# Patient Record
Sex: MALE | Race: WHITE | NOT HISPANIC OR LATINO | URBAN - METROPOLITAN AREA
[De-identification: names, ages, dates, MRNs, and addresses within clinical notes are randomized per-mention and may not be internally consistent; named-entity substitution may affect disease eponyms.]

---

## 2019-01-29 ENCOUNTER — OFFICE VISIT CONVERTED (OUTPATIENT)
Dept: FAMILY MEDICINE CLINIC | Facility: CLINIC | Age: 44
End: 2019-01-29
Attending: NURSE PRACTITIONER

## 2019-08-09 ENCOUNTER — OFFICE VISIT CONVERTED (OUTPATIENT)
Dept: FAMILY MEDICINE CLINIC | Facility: CLINIC | Age: 44
End: 2019-08-09
Attending: NURSE PRACTITIONER

## 2020-05-18 ENCOUNTER — TELEMEDICINE CONVERTED (OUTPATIENT)
Dept: FAMILY MEDICINE CLINIC | Facility: CLINIC | Age: 45
End: 2020-05-18
Attending: NURSE PRACTITIONER

## 2020-10-23 ENCOUNTER — OFFICE VISIT CONVERTED (OUTPATIENT)
Dept: FAMILY MEDICINE CLINIC | Facility: CLINIC | Age: 45
End: 2020-10-23
Attending: NURSE PRACTITIONER

## 2020-10-23 ENCOUNTER — CONVERSION ENCOUNTER (OUTPATIENT)
Dept: FAMILY MEDICINE CLINIC | Facility: CLINIC | Age: 45
End: 2020-10-23

## 2021-05-13 NOTE — PROGRESS NOTES
Progress Note      Patient Name: Adalberto Toribio   Patient ID: [id number]   Sex: Male   YOB: 1975    Primary Care Provider: Airam CHAMPAGNE   Referring Provider: Airam CHAMPAGNE    Visit Date: October 23, 2020    Provider: IHSAN Bhatt   Location: [facility]   Location Address: [address] [place] [address], [address] [place] [address]  [id number]   Location Phone: [phone]          Chief Complaint  · sores on face not healing      History Of Present Illness  Adalberto Toribio is a 45 year old /White male who presents for evaluation and treatment of:      He is here for an acute visit today.  He is complaining of sores on his face that have not healed that have been there for over a year.  He states he was referred to dermatology before but he was unable to make the appointment due to his work schedule.  He has a lesion on his left side of his nose below his eye and one above his right cheek.    He is a current smoker: He states he is working on trying to quit smoking.  He states he is down from what he previously smoked.  He states through the week he only smokes about 4 cigarettes/day but on the weekends he might smoke up to a half a pack per day.  He states he quit smoking in the past for 7 years and then for some reason picked it back up.  He states he will continue to work on quitting on his own.  He states that he has to quit smoking before he can have surgery on his foot for a bunion removal.       Past Medical History  Disease Name Date Onset Notes   Anxiety --  --    Depression --  --          Past Surgical History  Procedure Name Date Notes   *Denies any surgical procedures --  --          Medication List  Name Date Started Instructions   aspirin 81 mg oral tablet,delayed release (DR/EC)  take 1 tablet (81 mg) by oral route once daily         Allergy List  Allergen Name Date Reaction Notes   NO KNOWN DRUG ALLERGIES --  --  --        Allergies  "Reconciled  Family Medical History  Disease Name Relative/Age Notes   Heart Disease  --    Skin Carcinoma  --          Social History  Finding Status Start/Stop Quantity Notes   Tobacco Current every day --/-- 1/2 PPD --          Review of Systems  · Constitutional  o Denies  o : fever, fatigue, weight loss, weight gain  · Cardiovascular  o Denies  o : lower extremity edema, claudication, chest pressure, palpitations  · Respiratory  o Denies  o : shortness of breath, wheezing, cough, hemoptysis, dyspnea on exertion  · Gastrointestinal  o Denies  o : nausea, vomiting, diarrhea, constipation, abdominal pain  · Integument  o Admits  o : changes to existing skin lesions or moles  o Denies  o : rash  · Musculoskeletal  o Admits  o : foot pain  o Denies  o : limitation of motion      Vitals  Date Time BP Position Site L\R Cuff Size HR RR TEMP (F) WT  HT  BMI kg/m2 BSA m2 O2 Sat FR L/min FiO2        10/23/2020 04:04 /60 Sitting    87 - R  97.5 157lbs 6oz 5'  6\" 25.4 1.82 95 %            Physical Examination  · Constitutional  o Appearance  o : no acute distress, well-nourished  · Head and Face  o Head  o :   § Inspection  § : atraumatic, normocephalic  · Neck  o Thyroid  o : gland size normal, nontender, no nodules or masses present on palpation, symmetric  · Respiratory  o Respiratory Effort  o : breathing comfortably, symmetric chest rise  o Auscultation of Lungs  o : clear to asculatation bilaterally, no wheezes, rales, or rhonchii  · Cardiovascular  o Heart  o :   § Auscultation of Heart  § : regular rate and rhythm, no murmurs, rubs, or gallops  o Peripheral Vascular System  o :   § Extremities  § : no edema  · Lymphatic  o Neck  o : no lymphadenopathy present  · Skin and Subcutaneous Tissue  o General Inspection  o : Open lesion noted next to his nose on the left side just below his eye and another open lesion noted right above his right cheek  · Neurologic  o Mental Status Examination  o : "   § Orientation  § : grossly oriented to person, place and time  o Gait and Station  o :   § Gait Screening  § : normal gait  · Psychiatric  o General  o : normal mood and affect          Assessment  · Tobacco abuse counseling       Tobacco abuse counseling     V65.42/Z71.6  · Skin lesion of face     709.9/L98.9      Plan  · Orders  o ACO-17: Screened for tobacco use AND received tobacco cessation intervention (4004F) - V65.42/Z71.6 - 10/23/2020  o ACO-39: Current medications updated and reviewed (1159F, ) - - 10/23/2020  o DERMATOLOGY CONSULTATION (DERMA) - 709.9/L98.9 - 10/23/2020   [place]; Needs either first apt in am or last apt in afternoon  · Medications  o Medications have been Reconciled  o Transition of Care or Provider Policy  · Instructions  o Tobacco and smoking cessation counseling for more than 3 minutes was completed.  o Handouts were given to patient: [place] quit now  o Patient was educated/instructed on their diagnosis, treatment and medications prior to discharge from the clinic today.  o Patient counseled to stop smoking.  o Patient instructed to seek medical attention urgently for new or worsening symptoms.  o Call the office with any concerns or questions.  · Disposition  o Call or Return if symptoms worsen or persist.            Electronically Signed by: IHSAN Bhatt -Author on October 23, 2020 04:21:28 PM

## 2021-05-13 NOTE — PROGRESS NOTES
Quick Note      Patient Name: Adalberto Toribio   Patient ID: [id number]   Sex: Male   YOB: 1975    Primary Care Provider: Airam CHAMPAGNE   Referring Provider: Airam CHAMPAGNE    Visit Date: May 18, 2020    Provider: IHSAN Bhatt   Location: [facility]   Location Address: [address] [place] [address], [address]  [id number]   Location Phone: [phone]          History Of Present Illness  TELEHEALTH TELEPHONE VISIT  Chief Complaint: Problems with both feet.   Provider spent 8 minutes with the patient during telehealth visit.   The following staff were present during this visit: Kamini Jones CMA and IHSAN Reid.   Past Medical History/Overview of Patient Symptoms  Video Conferencing Visit  Adalberto Toribio is a 44 year old /White male who is presenting for evaluation via video conferencing. Verbal consent obtained before beginning visit.      He is complaining of an open sore between his fourth and fifth toes.  He states he has been using alcohol, peroxide and foot powder without any relief.  He denies itching but complains it is painful.    He is also complaining of a tender spot on his fifth toe of his right foot that is been there on and off for years.  He has tried corn removers and using a skin grater.       Physical Examination  · Constitutional  o Appearance  o : no acute distress, well-nourished  · Head and Face  o Head  o :   § Inspection  § : atraumatic, normocephalic  · Respiratory  o Respiratory Effort  o : breathing comfortably, symmetric chest rise  · Neurologic  o Mental Status Examination  o :   § Orientation  § : grossly oriented to person, place and time  o Gait and Station  o :   § Gait Screening  § : normal gait  · Psychiatric  o General  o : normal mood and affect          Assessment  · Foot pain, bilateral       Pain in right foot     729.5/M79.671  Pain in left foot     729.5/M79.672    Problems  Reconciled  Plan  · Orders  o Physican Telephone evaluation, 5-10 min (24860) - 729.5/M79.671, 729.5/M79.672 - 05/18/2020  o PODIATRY CONSULTATION (PODIA) - 729.5/M79.671, 729.5/M79.672 - 05/18/2020   pt c/o sore lesions/corn; Dr. Gallagher in [place]  · Medications  o Medications have been Reconciled  o Transition of Care or Provider Policy  · Instructions  o Plan Of Care:   o Patient instructed to seek medical attention urgently for new or worsening symptoms.  o Patient was educated/instructed on their diagnosis, treatment and medications today.  o Call the office with any concerns or questions.  o Discussed Covid-19 precautions including, but not limited to, social distancing, avoid touching your face, and hand washing.   · Disposition  o Call or Return if symptoms worsen or persist.            Electronically Signed by: IHSAN Bhatt -Author on May 18, 2020 01:03:49 PM

## 2021-05-14 VITALS
SYSTOLIC BLOOD PRESSURE: 114 MMHG | DIASTOLIC BLOOD PRESSURE: 60 MMHG | TEMPERATURE: 97.5 F | BODY MASS INDEX: 25.29 KG/M2 | HEIGHT: 66 IN | HEART RATE: 87 BPM | WEIGHT: 157.37 LBS | OXYGEN SATURATION: 95 %

## 2021-05-15 VITALS
TEMPERATURE: 99 F | HEART RATE: 67 BPM | WEIGHT: 153.5 LBS | SYSTOLIC BLOOD PRESSURE: 106 MMHG | OXYGEN SATURATION: 98 % | HEIGHT: 66 IN | BODY MASS INDEX: 24.67 KG/M2 | DIASTOLIC BLOOD PRESSURE: 54 MMHG

## 2021-05-15 VITALS
HEIGHT: 66 IN | BODY MASS INDEX: 26.2 KG/M2 | SYSTOLIC BLOOD PRESSURE: 118 MMHG | HEART RATE: 75 BPM | TEMPERATURE: 98.2 F | OXYGEN SATURATION: 95 % | WEIGHT: 163 LBS | DIASTOLIC BLOOD PRESSURE: 60 MMHG

## 2021-07-22 PROBLEM — F41.9 ANXIETY: Status: ACTIVE | Noted: 2021-07-22

## 2021-07-22 PROBLEM — F32.A DEPRESSION: Status: ACTIVE | Noted: 2021-07-22

## 2021-07-22 RX ORDER — ASPIRIN 81 MG/1
TABLET ORAL
COMMUNITY
End: 2021-11-22

## 2021-07-23 ENCOUNTER — OFFICE VISIT (OUTPATIENT)
Dept: FAMILY MEDICINE CLINIC | Facility: CLINIC | Age: 46
End: 2021-07-23

## 2021-07-23 VITALS
HEIGHT: 66 IN | SYSTOLIC BLOOD PRESSURE: 120 MMHG | TEMPERATURE: 97.1 F | HEART RATE: 67 BPM | BODY MASS INDEX: 23.75 KG/M2 | DIASTOLIC BLOOD PRESSURE: 78 MMHG | OXYGEN SATURATION: 99 % | WEIGHT: 147.8 LBS

## 2021-07-23 DIAGNOSIS — R68.82 LOW LIBIDO: ICD-10-CM

## 2021-07-23 DIAGNOSIS — R53.83 FATIGUE, UNSPECIFIED TYPE: ICD-10-CM

## 2021-07-23 DIAGNOSIS — F32.A DEPRESSION, UNSPECIFIED DEPRESSION TYPE: Primary | ICD-10-CM

## 2021-07-23 DIAGNOSIS — C44.300 SKIN CANCER OF FACE: ICD-10-CM

## 2021-07-23 LAB
25(OH)D3 SERPL-MCNC: 22.9 NG/ML
ALBUMIN SERPL-MCNC: 5 G/DL (ref 3.5–5.2)
ALBUMIN/GLOB SERPL: 2.3 G/DL
ALP SERPL-CCNC: 57 U/L (ref 39–117)
ALT SERPL W P-5'-P-CCNC: 18 U/L (ref 1–41)
ANION GAP SERPL CALCULATED.3IONS-SCNC: 9.8 MMOL/L (ref 5–15)
AST SERPL-CCNC: 22 U/L (ref 1–40)
BASOPHILS # BLD AUTO: 0.09 10*3/MM3 (ref 0–0.2)
BASOPHILS NFR BLD AUTO: 1.1 % (ref 0–1.5)
BILIRUB SERPL-MCNC: 0.7 MG/DL (ref 0–1.2)
BUN SERPL-MCNC: 10 MG/DL (ref 6–20)
BUN/CREAT SERPL: 11.2 (ref 7–25)
CALCIUM SPEC-SCNC: 9.7 MG/DL (ref 8.6–10.5)
CHLORIDE SERPL-SCNC: 103 MMOL/L (ref 98–107)
CHOLEST SERPL-MCNC: 153 MG/DL (ref 0–200)
CO2 SERPL-SCNC: 25.2 MMOL/L (ref 22–29)
CREAT SERPL-MCNC: 0.89 MG/DL (ref 0.76–1.27)
DEPRECATED RDW RBC AUTO: 40.9 FL (ref 37–54)
EOSINOPHIL # BLD AUTO: 0.31 10*3/MM3 (ref 0–0.4)
EOSINOPHIL NFR BLD AUTO: 3.8 % (ref 0.3–6.2)
ERYTHROCYTE [DISTWIDTH] IN BLOOD BY AUTOMATED COUNT: 13 % (ref 12.3–15.4)
FOLATE SERPL-MCNC: >20 NG/ML (ref 4.78–24.2)
GFR SERPL CREATININE-BSD FRML MDRD: 92 ML/MIN/1.73
GLOBULIN UR ELPH-MCNC: 2.2 GM/DL
GLUCOSE SERPL-MCNC: 88 MG/DL (ref 65–99)
HCT VFR BLD AUTO: 46.4 % (ref 37.5–51)
HDLC SERPL-MCNC: 31 MG/DL (ref 40–60)
HGB BLD-MCNC: 15.9 G/DL (ref 13–17.7)
IMM GRANULOCYTES # BLD AUTO: 0.02 10*3/MM3 (ref 0–0.05)
IMM GRANULOCYTES NFR BLD AUTO: 0.2 % (ref 0–0.5)
LDLC SERPL CALC-MCNC: 95 MG/DL (ref 0–100)
LDLC/HDLC SERPL: 2.95 {RATIO}
LYMPHOCYTES # BLD AUTO: 2.55 10*3/MM3 (ref 0.7–3.1)
LYMPHOCYTES NFR BLD AUTO: 31.2 % (ref 19.6–45.3)
MCH RBC QN AUTO: 29.8 PG (ref 26.6–33)
MCHC RBC AUTO-ENTMCNC: 34.3 G/DL (ref 31.5–35.7)
MCV RBC AUTO: 87.1 FL (ref 79–97)
MONOCYTES # BLD AUTO: 0.51 10*3/MM3 (ref 0.1–0.9)
MONOCYTES NFR BLD AUTO: 6.2 % (ref 5–12)
NEUTROPHILS NFR BLD AUTO: 4.7 10*3/MM3 (ref 1.7–7)
NEUTROPHILS NFR BLD AUTO: 57.5 % (ref 42.7–76)
NRBC BLD AUTO-RTO: 0 /100 WBC (ref 0–0.2)
PLATELET # BLD AUTO: 198 10*3/MM3 (ref 140–450)
PMV BLD AUTO: 10.6 FL (ref 6–12)
POTASSIUM SERPL-SCNC: 4.5 MMOL/L (ref 3.5–5.2)
PROT SERPL-MCNC: 7.2 G/DL (ref 6–8.5)
RBC # BLD AUTO: 5.33 10*6/MM3 (ref 4.14–5.8)
SODIUM SERPL-SCNC: 138 MMOL/L (ref 136–145)
T4 FREE SERPL-MCNC: 1.03 NG/DL (ref 0.93–1.7)
TRIGL SERPL-MCNC: 152 MG/DL (ref 0–150)
TSH SERPL DL<=0.05 MIU/L-ACNC: 1.87 UIU/ML (ref 0.27–4.2)
VIT B12 BLD-MCNC: 570 PG/ML (ref 211–946)
VLDLC SERPL-MCNC: 27 MG/DL (ref 5–40)
WBC # BLD AUTO: 8.18 10*3/MM3 (ref 3.4–10.8)

## 2021-07-23 PROCEDURE — 82607 VITAMIN B-12: CPT | Performed by: NURSE PRACTITIONER

## 2021-07-23 PROCEDURE — 85025 COMPLETE CBC W/AUTO DIFF WBC: CPT | Performed by: NURSE PRACTITIONER

## 2021-07-23 PROCEDURE — 84443 ASSAY THYROID STIM HORMONE: CPT | Performed by: NURSE PRACTITIONER

## 2021-07-23 PROCEDURE — 84403 ASSAY OF TOTAL TESTOSTERONE: CPT | Performed by: NURSE PRACTITIONER

## 2021-07-23 PROCEDURE — 82746 ASSAY OF FOLIC ACID SERUM: CPT | Performed by: NURSE PRACTITIONER

## 2021-07-23 PROCEDURE — 80053 COMPREHEN METABOLIC PANEL: CPT | Performed by: NURSE PRACTITIONER

## 2021-07-23 PROCEDURE — 99213 OFFICE O/P EST LOW 20 MIN: CPT | Performed by: NURSE PRACTITIONER

## 2021-07-23 PROCEDURE — 84402 ASSAY OF FREE TESTOSTERONE: CPT | Performed by: NURSE PRACTITIONER

## 2021-07-23 PROCEDURE — 82306 VITAMIN D 25 HYDROXY: CPT | Performed by: NURSE PRACTITIONER

## 2021-07-23 PROCEDURE — 84439 ASSAY OF FREE THYROXINE: CPT | Performed by: NURSE PRACTITIONER

## 2021-07-23 PROCEDURE — 80061 LIPID PANEL: CPT | Performed by: NURSE PRACTITIONER

## 2021-07-23 NOTE — ASSESSMENT & PLAN NOTE
Patient's depression is recurrent and is mild without psychosis. Their depression is currently active and the condition is unchanged. This will be reassessed at the next regular appointment. F/U as described:Declines intervention for depression, handout for managing depression per AVS..

## 2021-07-23 NOTE — PROGRESS NOTES
"Chief Complaint  Med Refill    Subjective          Adalberto Toribio presents to [facility]  History of Present Illness  He is here today for checkup to have labs drawn.  He states that his wife is complaining that he is not affectionate enough.  He does admit to feeling fatigued because he works 2 jobs.    His PHQ-9 score today is 6.  He does admit to having depression but states he does not want take any medication for this.  No suicidal ideation.    He has had some skin cancers on his face that have been removed and is still following with dermatology.    Patient was also seen by Louisa Navarrete, NP student.    Objective   Vital Signs:   /78   Pulse 67   Temp 97.1 °F (36.2 °C)   Ht 167.6 cm (66\")   Wt 67 kg (147 lb 12.8 oz)   SpO2 99%   BMI 23.86 kg/m²     Physical Exam  Vitals reviewed.   Constitutional:       Appearance: Normal appearance. He is well-developed.   Neck:      Thyroid: No thyroid mass, thyromegaly or thyroid tenderness.   Cardiovascular:      Rate and Rhythm: Normal rate and regular rhythm.      Heart sounds: No murmur heard.   No friction rub. No gallop.    Pulmonary:      Effort: Pulmonary effort is normal.      Breath sounds: Normal breath sounds. No wheezing or rhonchi.   Lymphadenopathy:      Cervical: No cervical adenopathy.   Skin:     General: Skin is warm and dry.   Neurological:      Mental Status: He is alert and oriented to person, place, and time.      Cranial Nerves: No cranial nerve deficit.   Psychiatric:         Mood and Affect: Mood and affect normal.         Behavior: Behavior normal.         Thought Content: Thought content normal. Thought content does not include homicidal or suicidal ideation.         Judgment: Judgment normal.        Result Review :                 Assessment and Plan    Diagnoses and all orders for this visit:    1. Depression, unspecified depression type (Primary)  Assessment & Plan:  Patient's depression is " recurrent and is mild without psychosis. Their depression is currently active and the condition is unchanged. This will be reassessed at the next regular appointment. F/U as described:Declines intervention for depression, handout for managing depression per AVS..    Orders:  -     Testosterone, Free, Total  -     Vitamin B12 & Folate  -     CBC & Differential  -     Comprehensive Metabolic Panel  -     Lipid Panel  -     TSH+Free T4  -     Vitamin D 25 Hydroxy    2. Low libido  -     Testosterone, Free, Total  -     Vitamin B12 & Folate  -     CBC & Differential  -     Comprehensive Metabolic Panel  -     Lipid Panel  -     TSH+Free T4  -     Vitamin D 25 Hydroxy    3. Fatigue, unspecified type  -     Testosterone, Free, Total  -     Vitamin B12 & Folate  -     CBC & Differential  -     Comprehensive Metabolic Panel  -     Lipid Panel  -     TSH+Free T4  -     Vitamin D 25 Hydroxy    4. Skin cancer of face  Comments:  Stable, following with dermatology.      Follow Up   Return if symptoms worsen or fail to improve.  Patient was given instructions and counseling regarding his condition or for health maintenance advice. Please see specific information pulled into the AVS if appropriate.

## 2021-07-23 NOTE — PATIENT INSTRUCTIONS
"http://APA.org/depression-guideline\"> https://clinicalkey.com\"> http://point-of-care.[employer] Media.Mamapedia/skills/\"> http://point-of-care.[employer].com\">   Managing Depression, Adult  Depression is a mental health condition that affects your thoughts, feelings, and actions. Being diagnosed with depression can bring you relief if you did not know why you have felt or behaved a certain way. It could also leave you feeling overwhelmed with uncertainty about your future. Preparing yourself to manage your symptoms can help you feel more positive about your future.  How to manage lifestyle changes  Managing stress    Stress is your body's reaction to life changes and events, both good and bad. Stress can add to your feelings of depression. Learning to manage your stress can help lessen your feelings of depression.  Try some of the following approaches to reducing your stress (stress reduction techniques):  · Listen to music that you enjoy and that inspires you.  · Try using a meditation nellie or take a meditation class.  · Develop a practice that helps you connect with your spiritual self. Walk in nature, pray, or go to a place of Adventism.  · Do some deep breathing. To do this, inhale slowly through your nose. Pause at the top of your inhale for a few seconds and then exhale slowly, letting your muscles relax.  · Practice yoga to help relax and work your muscles.  Choose a stress reduction technique that suits your lifestyle and personality. These techniques take time and practice to develop. Set aside 5-15 minutes a day to do them. Therapists can offer training in these techniques. Other things you can do to manage stress include:  · Keeping a stress diary.  · Knowing your limits and saying no when you think something is too much.  · Paying attention to how you react to certain situations. You may not be able to control everything, but you can change your reaction.  · Adding humor to your life by " watching funny films or TV shows.  · Making time for activities that you enjoy and that relax you.    Medicines  Medicines, such as antidepressants, are often a part of treatment for depression.  · Talk with your pharmacist or health care provider about all the medicines, supplements, and herbal products that you take, their possible side effects, and what medicines and other products are safe to take together.  · Make sure to report any side effects you may have to your health care provider.  Relationships  Your health care provider may suggest family therapy, couples therapy, or individual therapy as part of your treatment.  How to recognize changes  Everyone responds differently to treatment for depression. As you recover from depression, you may start to:  · Have more interest in doing activities.  · Feel less hopeless.  · Have more energy.  · Overeat less often, or have a better appetite.  · Have better mental focus.  It is important to recognize if your depression is not getting better or is getting worse. The symptoms you had in the beginning may return, such as:  · Tiredness (fatigue) or low energy.  · Eating too much or too little.  · Sleeping too much or too little.  · Feeling restless, agitated, or hopeless.  · Trouble focusing or making decisions.  · Unexplained physical complaints.  · Feeling irritable, angry, or aggressive.  If you or your family members notice these symptoms coming back, let your health care provider know right away.  Follow these instructions at home:  Activity    · Try to get some form of exercise each day, such as walking, biking, swimming, or lifting weights.  · Practice stress reduction techniques.  · Engage your mind by taking a class or doing some volunteer work.  Lifestyle  · Get the right amount and quality of sleep.  · Cut down on using caffeine, tobacco, alcohol, and other potentially harmful substances.  · Eat a healthy diet that includes plenty of vegetables, fruits,  whole grains, low-fat dairy products, and lean protein. Do not eat a lot of foods that are high in solid fats, added sugars, or salt (sodium).  General instructions  · Take over-the-counter and prescription medicines only as told by your health care provider.  · Keep all follow-up visits as told by your health care provider. This is important.  Where to find support  Talking to others    Friends and family members can be sources of support and guidance. Talk to trusted friends or family members about your condition. Explain your symptoms to them, and let them know that you are working with a health care provider to treat your depression. Tell friends and family members how they also can be helpful.  Finances  · Find appropriate mental health providers that fit with your financial situation.  · Talk with your health care provider about options to get reduced prices on your medicines.  Where to find more information  You can find support in your area from:  · Anxiety and Depression Association of [place] (ADAA): www.adaa.org  · Mental Health [place]: www.mentalhealthamerica.net  · National [place] on Mental Illness: www.mariia.org  Contact a health care provider if:  · You stop taking your antidepressant medicines, and you have any of these symptoms:  ? Nausea.  ? Headache.  ? Light-headedness.  ? Chills and body aches.  ? Not being able to sleep (insomnia).  · You or your friends and family think your depression is getting worse.  Get help right away if:  · You have thoughts of hurting yourself or others.  If you ever feel like you may hurt yourself or others, or have thoughts about taking your own life, get help right away. Go to your nearest emergency department or:  · Call your local emergency services (166 in the [place]  · Call a suicide crisis helpline, such as the National Suicide Prevention Lifeline at 1-212.873.8619. This is open 24 hours a day in the [place]  · Text the Crisis Text Line at 046552 (in the  [place]  Summary  · If you are diagnosed with depression, preparing yourself to manage your symptoms is a good way to feel positive about your future.  · Work with your health care provider on a management plan that includes stress reduction techniques, medicines (if applicable), therapy, and healthy lifestyle habits.  · Keep talking with your health care provider about how your treatment is working.  · If you have thoughts about taking your own life, call a suicide crisis helpline or text a crisis text line.  This information is not intended to replace advice given to you by your health care provider. Make sure you discuss any questions you have with your health care provider.  Document Revised: 10/28/2020 Document Reviewed: 10/28/2020  Elsevier Patient Education © 2021 [employer].

## 2021-07-28 LAB
TESTOST FREE SERPL-MCNC: 12.3 PG/ML (ref 6.8–21.5)
TESTOST SERPL-MCNC: 572 NG/DL (ref 264–916)

## 2021-11-22 ENCOUNTER — OFFICE VISIT (OUTPATIENT)
Dept: FAMILY MEDICINE CLINIC | Facility: CLINIC | Age: 46
End: 2021-11-22

## 2021-11-22 VITALS
SYSTOLIC BLOOD PRESSURE: 138 MMHG | HEIGHT: 66 IN | OXYGEN SATURATION: 99 % | BODY MASS INDEX: 23.88 KG/M2 | HEART RATE: 107 BPM | WEIGHT: 148.6 LBS | DIASTOLIC BLOOD PRESSURE: 72 MMHG | TEMPERATURE: 98.5 F

## 2021-11-22 DIAGNOSIS — M54.50 LUMBAR SPINE PAIN: Primary | ICD-10-CM

## 2021-11-22 PROCEDURE — 96372 THER/PROPH/DIAG INJ SC/IM: CPT | Performed by: NURSE PRACTITIONER

## 2021-11-22 PROCEDURE — 99213 OFFICE O/P EST LOW 20 MIN: CPT | Performed by: NURSE PRACTITIONER

## 2021-11-22 RX ORDER — PREDNISONE 20 MG/1
TABLET ORAL
Qty: 12 TABLET | Refills: 0 | Status: SHIPPED | OUTPATIENT
Start: 2021-11-22 | End: 2021-11-28

## 2021-11-22 RX ORDER — KETOROLAC TROMETHAMINE 30 MG/ML
60 INJECTION, SOLUTION INTRAMUSCULAR; INTRAVENOUS ONCE
Status: COMPLETED | OUTPATIENT
Start: 2021-11-22 | End: 2021-11-22

## 2021-11-22 RX ORDER — CYCLOBENZAPRINE HCL 10 MG
10 TABLET ORAL 3 TIMES DAILY PRN
Qty: 90 TABLET | Refills: 0 | Status: SHIPPED | OUTPATIENT
Start: 2021-11-22

## 2021-11-22 RX ADMIN — KETOROLAC TROMETHAMINE 60 MG: 30 INJECTION, SOLUTION INTRAMUSCULAR; INTRAVENOUS at 15:25

## 2021-11-22 NOTE — ASSESSMENT & PLAN NOTE
We will give him Toradol 60 mg IM today.  I will start him on prednisone Dosepak and Flexeril.  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  Information for back pain and back exercises provided, see AVS. instructed to follow-up if not improving.

## 2021-11-22 NOTE — PATIENT INSTRUCTIONS
Acute Back Pain, Adult  Acute back pain is sudden and usually short-lived. It is often caused by an injury to the muscles and tissues in the back. The injury may result from:  · A muscle or ligament getting overstretched or torn (strained). Ligaments are tissues that connect bones to each other. Lifting something improperly can cause a back strain.  · Wear and tear (degeneration) of the spinal disks. Spinal disks are circular tissue that provide cushioning between the bones of the spine (vertebrae).  · Twisting motions, such as while playing sports or doing yard work.  · A hit to the back.  · Arthritis.  You may have a physical exam, lab tests, and imaging tests to find the cause of your pain. Acute back pain usually goes away with rest and home care.  Follow these instructions at home:  Managing pain, stiffness, and swelling  · Treatment may include medicines for pain and inflammation that are taken by mouth or applied to the skin, prescription pain medicine, or muscle relaxants. Take over-the-counter and prescription medicines only as told by your health care provider.  · Your health care provider may recommend applying ice during the first 24-48 hours after your pain starts. To do this:  ? Put ice in a plastic bag.  ? Place a towel between your skin and the bag.  ? Leave the ice on for 20 minutes, 2-3 times a day.  · If directed, apply heat to the affected area as often as told by your health care provider. Use the heat source that your health care provider recommends, such as a moist heat pack or a heating pad.  ? Place a towel between your skin and the heat source.  ? Leave the heat on for 20-30 minutes.  ? Remove the heat if your skin turns bright red. This is especially important if you are unable to feel pain, heat, or cold. You have a greater risk of getting burned.  Activity    · Do not stay in bed. Staying in bed for more than 1-2 days can delay your recovery.  · Sit up and stand up straight. Avoid  leaning forward when you sit or hunching over when you stand.  ? If you work at a desk, sit close to it so you do not need to lean over. Keep your chin tucked in. Keep your neck drawn back, and keep your elbows bent at a 90-degree angle (right angle).  ? Sit high and close to the steering wheel when you drive. Add lower back (lumbar) support to your car seat, if needed.  · Take short walks on even surfaces as soon as you are able. Try to increase the length of time you walk each day.  · Do not sit, drive, or  one place for more than 30 minutes at a time. Sitting or standing for long periods of time can put stress on your back.  · Do not drive or use heavy machinery while taking prescription pain medicine.  · Use proper lifting techniques. When you bend and lift, use positions that put less stress on your back:  ? Bend your knees.  ? Keep the load close to your body.  ? Avoid twisting.  · Exercise regularly as told by your health care provider. Exercising helps your back heal faster and helps prevent back injuries by keeping muscles strong and flexible.  · Work with a physical therapist to make a safe exercise program, as recommended by your health care provider. Do any exercises as told by your physical therapist.    Lifestyle  · Maintain a healthy weight. Extra weight puts stress on your back and makes it difficult to have good posture.  · Avoid activities or situations that make you feel anxious or stressed. Stress and anxiety increase muscle tension and can make back pain worse. Learn ways to manage anxiety and stress, such as through exercise.  General instructions  · Sleep on a firm mattress in a comfortable position. Try lying on your side with your knees slightly bent. If you lie on your back, put a pillow under your knees.  · Follow your treatment plan as told by your health care provider. This may include:  ? Cognitive or behavioral therapy.  ? Acupuncture or massage therapy.  ? Meditation or  yoga.  Contact a health care provider if:  · You have pain that is not relieved with rest or medicine.  · You have increasing pain going down into your legs or buttocks.  · Your pain does not improve after 2 weeks.  · You have pain at night.  · You lose weight without trying.  · You have a fever or chills.  Get help right away if:  · You develop new bowel or bladder control problems.  · You have unusual weakness or numbness in your arms or legs.  · You develop nausea or vomiting.  · You develop abdominal pain.  · You feel faint.  Summary  · Acute back pain is sudden and usually short-lived.  · Use proper lifting techniques. When you bend and lift, use positions that put less stress on your back.  · Take over-the-counter and prescription medicines and apply heat or ice as directed by your health care provider.  This information is not intended to replace advice given to you by your health care provider. Make sure you discuss any questions you have with your health care provider.  Document Revised: 10/15/2020 Document Reviewed: 2018  [employer] Patient Education ©  [employer]    Back Exercises  These exercises help to make your trunk and back strong. They also help to keep the lower back flexible. Doing these exercises can help to prevent back pain or lessen existing pain.  · If you have back pain, try to do these exercises 2-3 times each day or as told by your doctor.  · As you get better, do the exercises once each day. Repeat the exercises more often as told by your doctor.  · To stop back pain from coming back, do the exercises once each day, or as told by your doctor.  Exercises  Single knee to chest  Do these steps 3-5 times in a row for each le. Lie on your back on a firm bed or the floor with your legs stretched out.  2. Bring one knee to your chest.  3. Grab your knee or thigh with both hands and hold them it in place.  4. Pull on your knee until you feel a gentle stretch in your lower back or  buttocks.  5. Keep doing the stretch for 10-30 seconds.  6. Slowly let go of your leg and straighten it.  Pelvic tilt  Do these steps 5-10 times in a row:  1. Lie on your back on a firm bed or the floor with your legs stretched out.  2. Bend your knees so they point up to the ceiling. Your feet should be flat on the floor.  3. Tighten your lower belly (abdomen) muscles to press your lower back against the floor. This will make your tailbone point up to the ceiling instead of pointing down to your feet or the floor.  4. Stay in this position for 5-10 seconds while you gently tighten your muscles and breathe evenly.  Cat-cow  Do these steps until your lower back bends more easily:  1. Get on your hands and knees on a firm surface. Keep your hands under your shoulders, and keep your knees under your hips. You may put padding under your knees.  2. Let your head hang down toward your chest. Tighten (contract) the muscles in your belly. Point your tailbone toward the floor so your lower back becomes rounded like the back of a cat.  3. Stay in this position for 5 seconds.  4. Slowly lift your head. Let the muscles of your belly relax. Point your tailbone up toward the ceiling so your back forms a sagging arch like the back of a cow.  5. Stay in this position for 5 seconds.    Press-ups  Do these steps 5-10 times in a row:  1. Lie on your belly (face-down) on the floor.  2. Place your hands near your head, about shoulder-width apart.  3. While you keep your back relaxed and keep your hips on the floor, slowly straighten your arms to raise the top half of your body and lift your shoulders. Do not use your back muscles. You may change where you place your hands in order to make yourself more comfortable.  4. Stay in this position for 5 seconds.  5. Slowly return to lying flat on the floor.    Bridges  Do these steps 10 times in a row:  1. Lie on your back on a firm surface.  2. Bend your knees so they point up to the ceiling.  Your feet should be flat on the floor. Your arms should be flat at your sides, next to your body.  3. Tighten your butt muscles and lift your butt off the floor until your waist is almost as high as your knees. If you do not feel the muscles working in your butt and the back of your thighs, slide your feet 1-2 inches farther away from your butt.  4. Stay in this position for 3-5 seconds.  5. Slowly lower your butt to the floor, and let your butt muscles relax.  If this exercise is too easy, try doing it with your arms crossed over your chest.  Belly crunches  Do these steps 5-10 times in a row:  1. Lie on your back on a firm bed or the floor with your legs stretched out.  2. Bend your knees so they point up to the ceiling. Your feet should be flat on the floor.  3. Cross your arms over your chest.  4. Tip your chin a little bit toward your chest but do not bend your neck.  5. Tighten your belly muscles and slowly raise your chest just enough to lift your shoulder blades a tiny bit off of the floor. Avoid raising your body higher than that, because it can put too much stress on your low back.  6. Slowly lower your chest and your head to the floor.  Back lifts  Do these steps 5-10 times in a row:  1. Lie on your belly (face-down) with your arms at your sides, and rest your forehead on the floor.  2. Tighten the muscles in your legs and your butt.  3. Slowly lift your chest off of the floor while you keep your hips on the floor. Keep the back of your head in line with the curve in your back. Look at the floor while you do this.  4. Stay in this position for 3-5 seconds.  5. Slowly lower your chest and your face to the floor.  Contact a doctor if:  · Your back pain gets a lot worse when you do an exercise.  · Your back pain does not get better 2 hours after you exercise.  If you have any of these problems, stop doing the exercises. Do not do them again unless your doctor says it is okay.  Get help right away if:  · You  have sudden, very bad back pain. If this happens, stop doing the exercises. Do not do them again unless your doctor says it is okay.  This information is not intended to replace advice given to you by your health care provider. Make sure you discuss any questions you have with your health care provider.  Document Revised: 09/12/2019 Document Reviewed: 09/12/2019  [employer] Patient Education © 2021 [employer]

## 2021-11-22 NOTE — PROGRESS NOTES
"Chief Complaint  Back Pain    Subjective          Adalberto Toribio presents to [facility]  History of Present Illness  He is here for an acute visit today complaining of back pain.  He states that he has a history of chronic back pain.  [facility] and recently had injections in his back which did help.  He said on 11/10/2021 he was putting down some baseboards in his back pain became worse.  He has been taking over-the-counter Tylenol or ibuprofen without any good relief.  He denies pain radiating to his legs.  Objective   Vital Signs:   /72   Pulse 107   Temp 98.5 °F (36.9 °C)   Ht 167.6 cm (66\")   Wt 67.4 kg (148 lb 9.6 oz)   SpO2 99%   BMI 23.98 kg/m²     Physical Exam  Vitals reviewed.   Constitutional:       Appearance: Normal appearance. He is well-developed.   Neck:      Thyroid: No thyroid mass, thyromegaly or thyroid tenderness.   Cardiovascular:      Rate and Rhythm: Normal rate and regular rhythm.      Heart sounds: No murmur heard.  No friction rub. No gallop.    Pulmonary:      Effort: Pulmonary effort is normal.      Breath sounds: Normal breath sounds. No wheezing or rhonchi.   Musculoskeletal:      Lumbar back: Spasms and tenderness present. No deformity.      Comments: Patient is obviously very uncomfortable and in pain.   Lymphadenopathy:      Cervical: No cervical adenopathy.   Skin:     General: Skin is warm and dry.   Neurological:      Mental Status: He is alert and oriented to person, place, and time.      Cranial Nerves: No cranial nerve deficit.   Psychiatric:         Mood and Affect: Mood and affect normal.         Behavior: Behavior normal.         Thought Content: Thought content normal. Thought content does not include homicidal or suicidal ideation.         Judgment: Judgment normal.        Result Review :                 Assessment and Plan    Diagnoses and all orders for this visit:    1. Lumbar spine " pain (Primary)  Assessment & Plan:  We will give him Toradol 60 mg IM today.  I will start him on prednisone Dosepak and Flexeril.  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  Information for back pain and back exercises provided, see AVS. instructed to follow-up if not improving.    Orders:  -     ketorolac (TORADOL) injection 60 mg    Other orders  -     predniSONE (DELTASONE) 20 MG tablet; Take 3 tablets by mouth Daily for 2 days, THEN 2 tablets Daily for 2 days, THEN 1 tablet Daily for 2 days.  Dispense: 12 tablet; Refill: 0  -     cyclobenzaprine (FLEXERIL) 10 MG tablet; Take 1 tablet by mouth 3 (Three) Times a Day As Needed for Muscle Spasms.  Dispense: 90 tablet; Refill: 0      Follow Up   Return if symptoms worsen or fail to improve.  Patient was given instructions and counseling regarding his condition or for health maintenance advice. Please see specific information pulled into the AVS if appropriate.

## 2023-04-07 ENCOUNTER — HOSPITAL ENCOUNTER (EMERGENCY)
Facility: HOSPITAL | Age: 48
Discharge: HOME | End: 2023-04-07
Attending: EMERGENCY MEDICINE | Admitting: EMERGENCY MEDICINE
Payer: OTHER GOVERNMENT

## 2023-04-07 VITALS
WEIGHT: 160 LBS | HEART RATE: 70 BPM | SYSTOLIC BLOOD PRESSURE: 145 MMHG | HEIGHT: 66 IN | BODY MASS INDEX: 25.71 KG/M2 | DIASTOLIC BLOOD PRESSURE: 66 MMHG | TEMPERATURE: 98.2 F | OXYGEN SATURATION: 96 % | RESPIRATION RATE: 18 BRPM
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
## 2023-04-07 PROCEDURE — 99282 EMERGENCY DEPT VISIT SF MDM: CPT

## 2023-04-08 NOTE — ED PROVIDER NOTES
"Time: 9:06 PM EDT  Date of encounter:  4/7/2023  Independent Historian/Clinical History and Information was obtained by:   Patient  Chief Complaint: Cat bite, scratch    History is limited by: N/A    History of Present Illness:  Patient is a 47 y.o. year old male who presents to the emergency department for evaluation of cat bite and scratch to his right hand.  Patient states this happened earlier today.  States the cat has been living outside his house for several days now.  He went to pet the cat and it allowed him to do so for several minutes before it scratched and bit.  He reports that the animal is healthy appearing.  He was seen in urgent care this afternoon, started on antibiotics and given a tetanus, and referred to ED for further evaluation.  Patient states that he has had a trap and is expected to catch the cat for quarantine.    HPI    Patient Care Team  Primary Care Provider: Airam Arboleda APRN    Past Medical History:     Allergies   Allergen Reactions   • Promethazine Other (See Comments)     \"you'll have to restrain me\"     Past Medical History:   Diagnosis Date   • Anxiety    • Depression      History reviewed. No pertinent surgical history.  Family History   Problem Relation Age of Onset   • Heart disease Other    • Skin cancer Other        Home Medications:  Prior to Admission medications    Medication Sig Start Date End Date Taking? Authorizing Provider   acetaminophen (TYLENOL) 500 MG tablet Take 1 tablet by mouth Every 6 (Six) Hours As Needed for Mild Pain. 4/7/23   Bebo Browne MD   amoxicillin-clavulanate (AUGMENTIN) 875-125 MG per tablet Take 1 tablet by mouth 2 (Two) Times a Day. 4/7/23   Bebo Browne MD   cyclobenzaprine (FLEXERIL) 10 MG tablet Take 1 tablet by mouth 3 (Three) Times a Day As Needed for Muscle Spasms. 11/22/21   Airam Arboleda APRN   diclofenac (VOLTAREN) 75 MG EC tablet Take 1 tablet by mouth 2 (Two) Times a Day. 4/7/23   Bebo Browne MD   mupirocin " "(BACTROBAN) 2 % ointment Apply 1 application topically to the appropriate area as directed 3 (Three) Times a Day. 4/7/23   Bebo Browne MD        Social History:   Social History     Tobacco Use   • Smoking status: Former     Packs/day: 0.50     Years: 31.00     Pack years: 15.50     Types: Cigarettes     Start date: 1990   • Smokeless tobacco: Never   Vaping Use   • Vaping Use: Some days   • Substances: Nicotine, Flavoring   • Devices: Disposable, Pre-filled or refillable cartridge, Refillable tank, Pre-filled pod   • Passive vaping exposure: Yes   Substance Use Topics   • Alcohol use: Never   • Drug use: Never         Review of Systems:  Review of Systems   I performed a 10 point review of systems which was all negative, except for the positives found in the HPI above.  Physical Exam:  /66 (BP Location: Left arm, Patient Position: Sitting)   Pulse 70   Temp 98.2 °F (36.8 °C) (Oral)   Resp 18   Ht 167.6 cm (66\")   Wt 72.6 kg (160 lb)   SpO2 96%   BMI 25.82 kg/m²     Physical Exam     General: Awake alert and in no acute distress     HEENT: Head normocephalic atraumatic, eyes PERRLA EOMI, nose normal, oropharynx normal.     Neck: Supple full range of motion, no meningismus, no lymphadenopathy     Heart: Regular rate and rhythm, no murmurs or rubs, 2+ radial pulses bilaterally     Lungs: Clear to auscultation bilaterally without wheezes or crackles, no respiratory distress     Abdomen: Soft, nontender, nondistended, no rebound or guarding     Back exam:  No L-spine tenderness.  No rash.     Skin: Warm, dry, no rash puncture wounds to right thumb, dorsum of right hand and right middle finger, mild swelling to dorsum of right hand, no erythema, no increased warmth     Musculoskeletal: Normal range of motion, no lower extremity edema     Neurologic: Oriented x3, no motor deficits no sensory deficits     Psychiatric: Mood appears stable, no psychosis          Procedures:  Procedures      Medical Decision " Making:      Comorbidities that affect care:    None    External Notes reviewed:    Previous Clinic Note: Urgent care today, was given tetanus and prescribed antibiotics      The following orders were placed and all results were independently analyzed by me:  No orders of the defined types were placed in this encounter.      Medications Given in the Emergency Department:  Medications - No data to display     ED Course:         Labs:    Lab Results (last 24 hours)     ** No results found for the last 24 hours. **           Imaging:    No Radiology Exams Resulted Within Past 24 Hours      Differential Diagnosis and Discussion:    Trauma:  Differential diagnosis considered but not limited to were subarachnoid hemorrhage, intracranial bleeding, pneumothorax, cardiac contusion, lung contusion, intra-abdominal bleeding, and compartment syndrome of any extremity or other significant traumatic pathology        MDM  Number of Diagnoses or Management Options  Cat bite, initial encounter  Diagnosis management comments: This was a provoked attack by a well-appearing house cat.  Rabies prophylaxis not indicated in this case.  Counseled patient to take antibiotics and apply antibiotic ointment as previously prescribed.  Discussed return precautions.  I do not believe that the patient has an acute emergency medical condition requiring additional emergency management at this time. The patient is currently stable for outpatient treatment and continuation of care. Important signs and symptoms that would warrant return to the emergency department were reviewed. The patient was provided the opportunity to ask questions. All questions were addressed and the patient was discharged from the ED. The patient demonstrated understanding and agreed to plan.    Risk of Complications, Morbidity, and/or Mortality  Presenting problems: moderate  Diagnostic procedures: low  Management options: low    Patient Progress  Patient progress: stable            Patient Care Considerations:          Consultants/Shared Management Plan:    I have discussed the case with Dr. Voss who states that the patient can be safely discharged with close follow up.    Social Determinants of Health:    Patient is independent, reliable, and has access to care.       Disposition and Care Coordination:    Discharged: The patient is suitable and stable for discharge with no need for consideration of observation or admission.         Final diagnoses:   Cat bite, initial encounter        ED Disposition     ED Disposition   Discharge    Condition   Stable    Comment   --             This medical record created using voice recognition software.           Yvonne Fregoso, IHSAN  04/08/23 [id number]

## 2023-04-08 NOTE — DISCHARGE INSTRUCTIONS
Wash your wounds with soap and water twice daily.  Take the antibiotics you were previously prescribed according to instructions.  Apply mupirocin as previously instructed.    Monitor for signs of infection such as pus drainage, redness, streaking, fever/chills, muscle aches or fatigue.    Follow-up with your PCP for reevaluation.    If able quarantine the cat.

## 2023-06-14 ENCOUNTER — OFFICE VISIT (OUTPATIENT)
Dept: FAMILY MEDICINE CLINIC | Facility: CLINIC | Age: 48
End: 2023-06-14
Payer: OTHER GOVERNMENT

## 2023-06-14 VITALS
SYSTOLIC BLOOD PRESSURE: 106 MMHG | BODY MASS INDEX: 24.59 KG/M2 | HEART RATE: 72 BPM | WEIGHT: 153 LBS | DIASTOLIC BLOOD PRESSURE: 56 MMHG | OXYGEN SATURATION: 98 % | TEMPERATURE: 98.4 F | HEIGHT: 66 IN

## 2023-06-14 DIAGNOSIS — C44.91 BASAL CELL CARCINOMA (BCC), UNSPECIFIED SITE: ICD-10-CM

## 2023-06-14 DIAGNOSIS — F41.8 DEPRESSION WITH ANXIETY: ICD-10-CM

## 2023-06-14 DIAGNOSIS — S91.331D PUNCTURE WOUND OF RIGHT FOOT, SUBSEQUENT ENCOUNTER: Primary | ICD-10-CM

## 2023-06-14 RX ORDER — OMEPRAZOLE 40 MG/1
40 CAPSULE, DELAYED RELEASE ORAL DAILY
COMMUNITY

## 2023-06-14 NOTE — PROGRESS NOTES
Chief Complaint  Establish Care (Transfer of care from [facility]) and Foot Injury (Pt stepped on gardening tool with right foot, went to , told to follow up with PCP)    Subjective         Adalberto Toribio presents to [facility]  HPI   This today for follow-up from urgent care for puncture wound to the foot.  He stepped on a garden tool on his right foot.  He reports mild pain on his foot.  No redness or drainage.  He went to urgent care on May 16, 1 month ago.  Up-to-date on his tetanus vaccine.  He had seen urgent care 3 weeks prior for a cat bite with infection.  He was treated with antibiotic at that time.    [facility]  He does need an updated referral to dermatology.  History of basal cell carcinoma.  [facility]    Depression anxiety.  Has seen a counselor in the past.        PHQ-9 Depression Screening  Little interest or pleasure in doing things? 2-->more than half the days   Feeling down, depressed, or hopeless? 3-->nearly every day   Trouble falling or staying asleep, or sleeping too much? 0-->not at all   Feeling tired or having little energy? 3-->nearly every day   Poor appetite or overeating? 0-->not at all   Feeling bad about yourself - or that you are a failure or have let yourself or your family down? 2-->more than half the days   Trouble concentrating on things, such as reading the newspaper or watching television? 0-->not at all   Moving or speaking so slowly that other people could have noticed? Or the opposite - being so fidgety or restless that you have been moving around a lot more than usual? 0-->not at all   Thoughts that you would be better off dead, or of hurting yourself in some way? 2-->more than half the days   PHQ-9 Total Score 12   If you checked off any problems, how difficult have these problems made it for you to do your work, take care of things at home, or get along with other people? not  "difficult at all         ROGE-7  Feeling nervous, anxious or on edge: More than half the days  Not being able to stop or control worrying: Several days  Worrying too much about different things: More than half the days  Trouble Relaxing: More than half the days  Being so restless that it is hard to sit still: Not at all  Feeling afraid as if something awful might happen: Nearly every day  Becoming easily annoyed or irritable: More than half the days  ROGE 7 Total Score: 12  If you checked any problems, how difficult have these problems made it for you to do your work, take care of things at home, or get along with other people: Somewhat difficult    Social History     Socioeconomic History    Marital status: Single   Tobacco Use    Smoking status: Former     Packs/day: 0.50     Years: 31.00     Pack years: 15.50     Types: Cigarettes     Start date:      Quit date:      Years since quittin.4    Smokeless tobacco: Never   Vaping Use    Vaping Use: Some days    Substances: Nicotine, Flavoring    Devices: Disposable, Pre-filled or refillable cartridge, Refillable tank, Pre-filled pod    Passive vaping exposure: Yes   Substance and Sexual Activity    Alcohol use: Never    Drug use: Never    Sexual activity: Defer        Objective     Vitals:    23 0852   BP: 106/56   BP Location: Left arm   Patient Position: Sitting   Cuff Size: Adult   Pulse: 72   Temp: 98.4 °F (36.9 °C)   TempSrc: Oral   SpO2: 98%   Weight: 69.4 kg (153 lb)   Height: 167.6 cm (66\")        Body mass index is 24.69 kg/m².    Wt Readings from Last 3 Encounters:   23 69.4 kg (153 lb)   23 70.3 kg (155 lb)   23 72.6 kg (160 lb)       BP Readings from Last 3 Encounters:   23 106/56   23 156/73   23 145/66         Physical Exam  Vitals reviewed.   Constitutional:       Appearance: Normal appearance. He is well-developed.   HENT:      Head: Normocephalic and atraumatic.      Right Ear: External ear normal. "      Left Ear: External ear normal.      Mouth/Throat:      Pharynx: No oropharyngeal exudate.   Eyes:      Conjunctiva/sclera: Conjunctivae normal.      Pupils: Pupils are equal, round, and reactive to light.   Cardiovascular:      Rate and Rhythm: Normal rate and regular rhythm.      Heart sounds: No murmur heard.    No friction rub. No gallop.   Pulmonary:      Effort: Pulmonary effort is normal.      Breath sounds: Normal breath sounds. No wheezing or rhonchi.   Skin:     General: Skin is warm and dry.   Neurological:      Mental Status: He is alert and oriented to person, place, and time.   Psychiatric:         Mood and Affect: Mood and affect normal.         Behavior: Behavior normal.         Thought Content: Thought content normal.         Judgment: Judgment normal.        Result Review :   The following data was reviewed by: IHSAN Carson on 06/14/2023:      Procedures    Assessment and Plan   Diagnoses and all orders for this visit:    1. Puncture wound of right foot, subsequent encounter (Primary)    2. Basal cell carcinoma (BCC), unspecified site  -     Ambulatory Referral to Dermatology    3. Depression with anxiety      Puncture wound to right foot is healing well.  No redness or signs of infection.  Put an update referral for [facility] for consultation with dermatology.  He has depression with anxiety.  Patient declines medication or counseling at this time.  He states he is doing well with it.  Denies any suicidal ideation.    BMI is within normal parameters. No other follow-up for BMI required.        Follow Up   Return if symptoms worsen or fail to improve.  Patient was given instructions and counseling regarding his condition or for health maintenance advice. Please see specific information pulled into the AVS if appropriate.     Please note that portions of this note were completed with a voice recognition program.

## 2023-09-28 ENCOUNTER — OFFICE VISIT (OUTPATIENT)
Dept: FAMILY MEDICINE CLINIC | Facility: CLINIC | Age: 48
End: 2023-09-28
Payer: OTHER GOVERNMENT

## 2023-09-28 VITALS
OXYGEN SATURATION: 100 % | TEMPERATURE: 97 F | WEIGHT: 154 LBS | DIASTOLIC BLOOD PRESSURE: 76 MMHG | BODY MASS INDEX: 24.75 KG/M2 | SYSTOLIC BLOOD PRESSURE: 124 MMHG | HEIGHT: 66 IN | HEART RATE: 69 BPM

## 2023-09-28 DIAGNOSIS — J34.0 CELLULITIS OF MUCOUS MEMBRANE OF NOSE: Primary | ICD-10-CM

## 2023-09-28 PROCEDURE — 99213 OFFICE O/P EST LOW 20 MIN: CPT | Performed by: NURSE PRACTITIONER

## 2023-09-28 RX ORDER — SULFAMETHOXAZOLE AND TRIMETHOPRIM 800; 160 MG/1; MG/1
1 TABLET ORAL 2 TIMES DAILY
Qty: 14 TABLET | Refills: 0 | Status: SHIPPED | OUTPATIENT
Start: 2023-09-28 | End: 2023-10-05

## 2023-09-28 NOTE — PROGRESS NOTES
"Chief Complaint  Nasal Bump    Subjective      Adalberto Toribio is a 48-year-old male that presents to [facility] with c/o left nostril pain.  He states that he thought it may be a pimple initially. Pain is worse since it started. No fever, body aches or chills. No recent illness or URI.   Bump just appeared one day, no injury or trauma to the area.    History of Present Illness    Current Outpatient Medications   Medication Instructions   • acetaminophen (TYLENOL) 500 mg, Oral, Every 6 Hours PRN   • mupirocin (BACTROBAN) 2 % nasal ointment 1 application , Nasal, 2 Times Daily   • omeprazole (PRILOSEC) 40 mg, Oral, Daily   • sulfamethoxazole-trimethoprim (BACTRIM DS,SEPTRA DS) 800-160 MG per tablet 1 tablet, Oral, 2 Times Daily       The following portions of the patient's history were reviewed and updated as appropriate: allergies, current medications, past family history, past medical history, past social history, past surgical history, and problem list.    Objective   Vital Signs:   /76   Pulse 69   Temp 97 °F (36.1 °C)   Ht 167.6 cm (66\")   Wt 69.9 kg (154 lb)   SpO2 100%   BMI 24.86 kg/m²     Wt Readings from Last 3 Encounters:   09/28/23 69.9 kg (154 lb)   06/14/23 69.4 kg (153 lb)   05/16/23 70.3 kg (155 lb)     BP Readings from Last 3 Encounters:   09/28/23 124/76   06/14/23 106/56   05/16/23 156/73     Physical Exam  Vitals reviewed.   Constitutional:       Appearance: Normal appearance. He is well-developed. He is not ill-appearing.   HENT:      Head: Normocephalic and atraumatic.        Comments: Mild swelling to the left cheek      Nose:        Comments: Erythematous bump inside left nare. Mild external redness.  Eyes:      Conjunctiva/sclera: Conjunctivae normal.      Pupils: Pupils are equal, round, and reactive to light.   Neck:      Thyroid: No thyromegaly or thyroid tenderness.   Cardiovascular:      Rate and Rhythm: Normal rate.   Pulmonary:      " Effort: Pulmonary effort is normal.      Breath sounds: Normal breath sounds.   Skin:     General: Skin is warm and dry.   Neurological:      Mental Status: He is alert and oriented to person, place, and time.   Psychiatric:         Mood and Affect: Affect normal.        Result Review :  The following data was reviewed by: IHSAN Kilpatrick on 09/28/2023:          Lab Results (last 72 hours)       ** No results found for the last 72 hours. **             No Images in the past 120 days found..    No results found for: SARSANTIGEN, COVID19, RAPFLUA, RAPFLUB, FLUAAG, FLUABDAG, FLUABDAG, FLU, FLU, FLUBAG, RAPSCRN, STREPAAG, RSV, POCPREGUR, MONOSPOT, INR, LEADCAPBLD, POCLEAD, BILIRUBINUR, POCGLU, POCPREGUR    Procedures        Assessment and Plan   Diagnoses and all orders for this visit:    1. Cellulitis of mucous membrane of nose (Primary)  -     sulfamethoxazole-trimethoprim (BACTRIM DS,SEPTRA DS) 800-160 MG per tablet; Take 1 tablet by mouth 2 (Two) Times a Day for 7 days.  Dispense: 14 tablet; Refill: 0  -     mupirocin (BACTROBAN) 2 % nasal ointment; 1 application  into the nostril(s) as directed by provider 2 (Two) Times a Day.  Dispense: 1 g; Refill: 0        BMI is within normal parameters. No other follow-up for BMI required.         There are no discontinued medications.       Follow Up   No follow-ups on file.  Patient was given instructions and counseling regarding his condition or for health maintenance advice. Please see specific information pulled into the AVS if appropriate.       IHSAN Kilpatrick  09/29/23  10:51 EDT

## 2024-02-13 ENCOUNTER — HOSPITAL ENCOUNTER (INPATIENT)
Facility: HOSPITAL | Age: 49
LOS: 5 days | Discharge: HOME | DRG: 439 | End: 2024-02-18
Attending: EMERGENCY MEDICINE | Admitting: FAMILY MEDICINE
Payer: OTHER GOVERNMENT

## 2024-02-13 ENCOUNTER — APPOINTMENT (OUTPATIENT)
Dept: CT IMAGING | Facility: HOSPITAL | Age: 49
DRG: 439 | End: 2024-02-13
Payer: OTHER GOVERNMENT

## 2024-02-13 ENCOUNTER — APPOINTMENT (OUTPATIENT)
Dept: GENERAL RADIOLOGY | Facility: HOSPITAL | Age: 49
DRG: 439 | End: 2024-02-13
Payer: OTHER GOVERNMENT

## 2024-02-13 DIAGNOSIS — K85.00 IDIOPATHIC ACUTE PANCREATITIS, UNSPECIFIED COMPLICATION STATUS: ICD-10-CM

## 2024-02-13 DIAGNOSIS — K85.90 ACUTE PANCREATITIS WITHOUT INFECTION OR NECROSIS, UNSPECIFIED PANCREATITIS TYPE: Primary | ICD-10-CM

## 2024-02-13 DIAGNOSIS — R26.2 DIFFICULTY WALKING: ICD-10-CM

## 2024-02-13 LAB
ALBUMIN SERPL-MCNC: 4.8 G/DL (ref 3.5–5.2)
ALBUMIN/GLOB SERPL: 1.5 G/DL
ALP SERPL-CCNC: 76 U/L (ref 39–117)
ALT SERPL W P-5'-P-CCNC: 31 U/L (ref 1–41)
ANION GAP SERPL CALCULATED.3IONS-SCNC: 12.3 MMOL/L (ref 5–15)
AST SERPL-CCNC: 33 U/L (ref 1–40)
BASOPHILS # BLD AUTO: 0.04 10*3/MM3 (ref 0–0.2)
BASOPHILS NFR BLD AUTO: 0.3 % (ref 0–1.5)
BILIRUB SERPL-MCNC: 1.1 MG/DL (ref 0–1.2)
BUN SERPL-MCNC: 10 MG/DL (ref 6–20)
BUN/CREAT SERPL: 9.6 (ref 7–25)
CALCIUM SPEC-SCNC: 9.7 MG/DL (ref 8.6–10.5)
CHLORIDE SERPL-SCNC: 100 MMOL/L (ref 98–107)
CO2 SERPL-SCNC: 24.7 MMOL/L (ref 22–29)
CREAT SERPL-MCNC: 1.04 MG/DL (ref 0.76–1.27)
DEPRECATED RDW RBC AUTO: 39.7 FL (ref 37–54)
EGFRCR SERPLBLD CKD-EPI 2021: 88 ML/MIN/1.73
EOSINOPHIL # BLD AUTO: 0.01 10*3/MM3 (ref 0–0.4)
EOSINOPHIL NFR BLD AUTO: 0.1 % (ref 0.3–6.2)
ERYTHROCYTE [DISTWIDTH] IN BLOOD BY AUTOMATED COUNT: 12.5 % (ref 12.3–15.4)
ETHANOL BLD-MCNC: <10 MG/DL (ref 0–10)
ETHANOL UR QL: <0.01 %
GLOBULIN UR ELPH-MCNC: 3.3 GM/DL
GLUCOSE SERPL-MCNC: 121 MG/DL (ref 65–99)
HCT VFR BLD AUTO: 47.6 % (ref 37.5–51)
HGB BLD-MCNC: 15.8 G/DL (ref 13–17.7)
HOLD SPECIMEN: NORMAL
HOLD SPECIMEN: NORMAL
IMM GRANULOCYTES # BLD AUTO: 0.04 10*3/MM3 (ref 0–0.05)
IMM GRANULOCYTES NFR BLD AUTO: 0.3 % (ref 0–0.5)
LIPASE SERPL-CCNC: 1621 U/L (ref 13–60)
LYMPHOCYTES # BLD AUTO: 0.91 10*3/MM3 (ref 0.7–3.1)
LYMPHOCYTES NFR BLD AUTO: 6.9 % (ref 19.6–45.3)
MAGNESIUM SERPL-MCNC: 2.4 MG/DL (ref 1.6–2.6)
MCH RBC QN AUTO: 28.9 PG (ref 26.6–33)
MCHC RBC AUTO-ENTMCNC: 33.2 G/DL (ref 31.5–35.7)
MCV RBC AUTO: 87 FL (ref 79–97)
MONOCYTES # BLD AUTO: 0.54 10*3/MM3 (ref 0.1–0.9)
MONOCYTES NFR BLD AUTO: 4.1 % (ref 5–12)
NEUTROPHILS NFR BLD AUTO: 11.65 10*3/MM3 (ref 1.7–7)
NEUTROPHILS NFR BLD AUTO: 88.3 % (ref 42.7–76)
NRBC BLD AUTO-RTO: 0 /100 WBC (ref 0–0.2)
NT-PROBNP SERPL-MCNC: <36 PG/ML (ref 0–450)
PLATELET # BLD AUTO: 212 10*3/MM3 (ref 140–450)
PMV BLD AUTO: 9.6 FL (ref 6–12)
POTASSIUM SERPL-SCNC: 4.3 MMOL/L (ref 3.5–5.2)
PROT SERPL-MCNC: 8.1 G/DL (ref 6–8.5)
QT INTERVAL: 343 MS
QTC INTERVAL: 441 MS
RBC # BLD AUTO: 5.47 10*6/MM3 (ref 4.14–5.8)
SODIUM SERPL-SCNC: 137 MMOL/L (ref 136–145)
TROPONIN T SERPL HS-MCNC: <6 NG/L
WBC NRBC COR # BLD AUTO: 13.19 10*3/MM3 (ref 3.4–10.8)
WHOLE BLOOD HOLD COAG: NORMAL
WHOLE BLOOD HOLD SPECIMEN: NORMAL

## 2024-02-13 PROCEDURE — 82077 ASSAY SPEC XCP UR&BREATH IA: CPT | Performed by: EMERGENCY MEDICINE

## 2024-02-13 PROCEDURE — 99222 1ST HOSP IP/OBS MODERATE 55: CPT | Performed by: FAMILY MEDICINE

## 2024-02-13 PROCEDURE — 84484 ASSAY OF TROPONIN QUANT: CPT | Performed by: EMERGENCY MEDICINE

## 2024-02-13 PROCEDURE — 25810000003 SODIUM CHLORIDE 0.9 % SOLUTION: Performed by: EMERGENCY MEDICINE

## 2024-02-13 PROCEDURE — 25010000002 HYDROMORPHONE 1 MG/ML SOLUTION: Performed by: FAMILY MEDICINE

## 2024-02-13 PROCEDURE — 83735 ASSAY OF MAGNESIUM: CPT | Performed by: EMERGENCY MEDICINE

## 2024-02-13 PROCEDURE — 85025 COMPLETE CBC W/AUTO DIFF WBC: CPT

## 2024-02-13 PROCEDURE — 25010000002 ONDANSETRON PER 1 MG: Performed by: EMERGENCY MEDICINE

## 2024-02-13 PROCEDURE — 74177 CT ABD & PELVIS W/CONTRAST: CPT

## 2024-02-13 PROCEDURE — 71045 X-RAY EXAM CHEST 1 VIEW: CPT

## 2024-02-13 PROCEDURE — 93005 ELECTROCARDIOGRAM TRACING: CPT | Performed by: EMERGENCY MEDICINE

## 2024-02-13 PROCEDURE — 25010000002 HYDROMORPHONE 1 MG/ML SOLUTION: Performed by: EMERGENCY MEDICINE

## 2024-02-13 PROCEDURE — 83690 ASSAY OF LIPASE: CPT | Performed by: EMERGENCY MEDICINE

## 2024-02-13 PROCEDURE — 25510000001 IOPAMIDOL PER 1 ML: Performed by: EMERGENCY MEDICINE

## 2024-02-13 PROCEDURE — 25010000002 MORPHINE PER 10 MG: Performed by: EMERGENCY MEDICINE

## 2024-02-13 PROCEDURE — 25810000003 SODIUM CHLORIDE 0.9 % SOLUTION: Performed by: FAMILY MEDICINE

## 2024-02-13 PROCEDURE — 25010000002 HEPARIN (PORCINE) PER 1000 UNITS: Performed by: FAMILY MEDICINE

## 2024-02-13 PROCEDURE — 83880 ASSAY OF NATRIURETIC PEPTIDE: CPT | Performed by: EMERGENCY MEDICINE

## 2024-02-13 PROCEDURE — 80053 COMPREHEN METABOLIC PANEL: CPT | Performed by: EMERGENCY MEDICINE

## 2024-02-13 PROCEDURE — 99285 EMERGENCY DEPT VISIT HI MDM: CPT

## 2024-02-13 PROCEDURE — 93005 ELECTROCARDIOGRAM TRACING: CPT

## 2024-02-13 RX ORDER — BISACODYL 5 MG/1
5 TABLET, DELAYED RELEASE ORAL DAILY PRN
Status: DISCONTINUED | OUTPATIENT
Start: 2024-02-13 | End: 2024-02-18 | Stop reason: HOSPADM

## 2024-02-13 RX ORDER — SODIUM CHLORIDE 0.9 % (FLUSH) 0.9 %
10 SYRINGE (ML) INJECTION AS NEEDED
Status: DISCONTINUED | OUTPATIENT
Start: 2024-02-13 | End: 2024-02-18 | Stop reason: HOSPADM

## 2024-02-13 RX ORDER — SODIUM CHLORIDE 9 MG/ML
40 INJECTION, SOLUTION INTRAVENOUS AS NEEDED
Status: DISCONTINUED | OUTPATIENT
Start: 2024-02-13 | End: 2024-02-18 | Stop reason: HOSPADM

## 2024-02-13 RX ORDER — NITROGLYCERIN 0.4 MG/1
0.4 TABLET SUBLINGUAL
Status: DISCONTINUED | OUTPATIENT
Start: 2024-02-13 | End: 2024-02-14

## 2024-02-13 RX ORDER — SODIUM CHLORIDE 9 MG/ML
150 INJECTION, SOLUTION INTRAVENOUS CONTINUOUS
Status: DISCONTINUED | OUTPATIENT
Start: 2024-02-13 | End: 2024-02-16

## 2024-02-13 RX ORDER — SODIUM CHLORIDE 0.9 % (FLUSH) 0.9 %
10 SYRINGE (ML) INJECTION EVERY 12 HOURS SCHEDULED
Status: DISCONTINUED | OUTPATIENT
Start: 2024-02-13 | End: 2024-02-18 | Stop reason: HOSPADM

## 2024-02-13 RX ORDER — BISACODYL 10 MG
10 SUPPOSITORY, RECTAL RECTAL DAILY PRN
Status: DISCONTINUED | OUTPATIENT
Start: 2024-02-13 | End: 2024-02-18 | Stop reason: HOSPADM

## 2024-02-13 RX ORDER — AMOXICILLIN 250 MG
2 CAPSULE ORAL 2 TIMES DAILY PRN
Status: DISCONTINUED | OUTPATIENT
Start: 2024-02-13 | End: 2024-02-15

## 2024-02-13 RX ORDER — ONDANSETRON 2 MG/ML
4 INJECTION INTRAMUSCULAR; INTRAVENOUS ONCE
Status: COMPLETED | OUTPATIENT
Start: 2024-02-13 | End: 2024-02-13

## 2024-02-13 RX ORDER — ASPIRIN 81 MG/1
324 TABLET, CHEWABLE ORAL ONCE
Status: COMPLETED | OUTPATIENT
Start: 2024-02-13 | End: 2024-02-13

## 2024-02-13 RX ORDER — POLYETHYLENE GLYCOL 3350 17 G/17G
17 POWDER, FOR SOLUTION ORAL DAILY PRN
Status: DISCONTINUED | OUTPATIENT
Start: 2024-02-13 | End: 2024-02-18 | Stop reason: HOSPADM

## 2024-02-13 RX ORDER — HEPARIN SODIUM 5000 [USP'U]/ML
5000 INJECTION, SOLUTION INTRAVENOUS; SUBCUTANEOUS EVERY 8 HOURS SCHEDULED
Status: DISCONTINUED | OUTPATIENT
Start: 2024-02-13 | End: 2024-02-16

## 2024-02-13 RX ORDER — ONDANSETRON 4 MG/1
4 TABLET, ORALLY DISINTEGRATING ORAL EVERY 6 HOURS PRN
Status: DISCONTINUED | OUTPATIENT
Start: 2024-02-13 | End: 2024-02-18 | Stop reason: HOSPADM

## 2024-02-13 RX ORDER — ONDANSETRON 2 MG/ML
4 INJECTION INTRAMUSCULAR; INTRAVENOUS EVERY 6 HOURS PRN
Status: DISCONTINUED | OUTPATIENT
Start: 2024-02-13 | End: 2024-02-18 | Stop reason: HOSPADM

## 2024-02-13 RX ADMIN — ASPIRIN 81 MG CHEWABLE TABLET 324 MG: 81 TABLET CHEWABLE at 13:48

## 2024-02-13 RX ADMIN — Medication 10 ML: at 21:54

## 2024-02-13 RX ADMIN — HYDROMORPHONE HYDROCHLORIDE 0.5 MG: 1 INJECTION, SOLUTION INTRAMUSCULAR; INTRAVENOUS; SUBCUTANEOUS at 15:00

## 2024-02-13 RX ADMIN — MORPHINE SULFATE 4 MG: 4 INJECTION, SOLUTION INTRAMUSCULAR; INTRAVENOUS at 15:52

## 2024-02-13 RX ADMIN — HYDROMORPHONE HYDROCHLORIDE 0.5 MG: 1 INJECTION, SOLUTION INTRAMUSCULAR; INTRAVENOUS; SUBCUTANEOUS at 21:55

## 2024-02-13 RX ADMIN — HEPARIN SODIUM 5000 UNITS: 5000 INJECTION INTRAVENOUS; SUBCUTANEOUS at 21:54

## 2024-02-13 RX ADMIN — IOPAMIDOL 100 ML: 755 INJECTION, SOLUTION INTRAVENOUS at 14:56

## 2024-02-13 RX ADMIN — SODIUM CHLORIDE 150 ML/HR: 9 INJECTION, SOLUTION INTRAVENOUS at 21:55

## 2024-02-13 RX ADMIN — SODIUM CHLORIDE 1000 ML: 9 INJECTION, SOLUTION INTRAVENOUS at 15:00

## 2024-02-13 RX ADMIN — SODIUM CHLORIDE 150 ML/HR: 9 INJECTION, SOLUTION INTRAVENOUS at 21:56

## 2024-02-13 RX ADMIN — HYDROMORPHONE HYDROCHLORIDE 0.5 MG: 1 INJECTION, SOLUTION INTRAMUSCULAR; INTRAVENOUS; SUBCUTANEOUS at 18:58

## 2024-02-13 RX ADMIN — ONDANSETRON 4 MG: 2 INJECTION INTRAMUSCULAR; INTRAVENOUS at 15:00

## 2024-02-13 NOTE — H&P
[facility]   HISTORY AND PHYSICAL    Patient Name: Adalberto Toribio  : 1975  MRN: [id number]  Primary Care Physician:  Jamil Katz APRN  Date of admission: 2024    Subjective   Subjective     Chief Complaint:   Abdominal pain  History of Present Illness  Patient is a 48-year-old male with past medical history of anxiety depression and GERD.  He presented to the emergency department with a 3-day history of increasing epigastric abdominal pain.  Patient says that initially the pain was worse after he ate but has intensified over the last 24 hours to be constant.  He rates it as a 7 out of 10 with radiation into his back.  Patient denies alcohol use.  He has had no abdominal surgeries.  Here in the ED his lipase was elevated greater than 1600.  A CT scan of the abdomen and pelvis was obtained which showed inflammatory change centered around the pancreas compatible with acute pancreatitis.  There is no evidence of pseudocyst or abscess formation.  Initially the ER doctor had planned on discharging home with pain medications however the patient's pain has quickly returned after just a couple of hours.  He is also having a difficult time keeping down liquids.  Because of this the ED doctor asked that we admit for further evaluation and treatment  Review of Systems     Personal History     Past Medical History:   Diagnosis Date    Anxiety     Depression     GERD (gastroesophageal reflux disease)        Past Surgical History:   Procedure Laterality Date    SKIN CANCER EXCISION      6 procedures for skin cancer removal       Family History: family history includes Heart disease in an other family member; Skin cancer in an other family member. Otherwise pertinent FHx was reviewed and not pertinent to current issue.    Social History:  reports that he has been smoking electronic cigarette. He has never used smokeless tobacco. He reports that he does not drink alcohol and does not use drugs.    Home  "Medications:       Allergies:  Allergies   Allergen Reactions    Promethazine Other (See Comments)     \"you'll have to restrain me\"    Tramadol Confusion       Objective    Objective     Vitals:   Temp:  [97.9 °F (36.6 °C)] 97.9 °F (36.6 °C)  Heart Rate:  [91] 91  Resp:  [18-20] 20  BP: (125-145)/(73-75) 125/73    Physical Exam  Constitutional:  Well-developed and well-nourished.  No acute distress.      HENT:  Head:  Normocephalic and atraumatic.  Mouth:  Moist mucous membranes.    Eyes:  Conjunctivae and EOM are normal. No scleral icterus.    Neck:  Neck supple.  No JVD present.    Cardiovascular:  Normal rate, regular rhythm and normal heart sounds with no murmur.  Pulmonary/Chest:  No respiratory distress, no wheezes, no crackles, with normal breath sounds and good air movement.  Abdominal: Epigastric and right upper quadrant tenderness with palpation.  Mild guarding without rebound.  Bowel sounds are present in all 4 quadrants  Musculoskeletal:  No tenderness, and no deformity.  No red or swollen joints anywhere.    Neurological:  Alert and oriented to person, place, and time.  No cranial nerve deficit.    Skin:  Skin is warm and dry. No rash noted. No pallor.   Peripheral vascular:  No clubbing, no cyanosis, no edema.  Psychiatric: Appropriate mood and affect  : Deferred  Result Review    Result Review:  I have personally reviewed the results from the time of this admission to 2/13/2024 18:17 EST and agree with these findings:  [x]  Laboratory list / accordion  []  Microbiology  [x]  Radiology  []  EKG/Telemetry   []  Cardiology/Vascular   []  Pathology  []  Old records  []  Other:  Most notable findings include: Pertinent positives reviewed      Assessment & Plan   Assessment / Plan     Brief Patient Summary:  Adalberto Toribio is a 48 y.o. male who presents to the ED with abdominal pain.  He was found to have acute uncomplicated pancreatitis.  He will be admitted for IV fluids and pain " medication.    Active Hospital Problems:  1.  Acute pancreatitis   2.  Mild leukocytosis  3.  Anxiety  4.  Depression  Plan:   Patient will be admitted to the hospitalist service  We will continue to workup possible causes of the pancreatitis as the patient is adamant that he does not drink alcohol.  I will obtain a lipid panel to rule out very high triglyceridemia  I will get an ultrasound of the right upper quadrant for better evaluation of the patient's gallbladder and common bile duct.  Will keep the patient n.p.o.  I will hydrate with normal saline at 150 cc/h  I will treat the patient's pain with IV Dilaudid.  This is what was required in the ED in order to get the patient's discomfort treated.    DVT prophylaxis:  Medical DVT prophylaxis orders are signed and held.          CODE STATUS:    Code Status (Patient has no pulse and is not breathing): CPR (Attempt to Resuscitate)  Medical Interventions (Patient has pulse or is breathing): Full Support    Admission Status:  I believe this patient meets inpatient status.    Johny Ruiz, DO

## 2024-02-13 NOTE — ED PROVIDER NOTES
"Time: 3:14 PM EST  Date of encounter:  2/13/2024  Independent Historian/Clinical History and Information was obtained by:   Patient    History is limited by: N/A    Chief Complaint: Abd pain      History of Present Illness:  Patient is a 48 y.o. year old male who presents to the emergency department for evaluation of abd pain. Reports epigastric pain radiating to mid abd and RUQ w nausea since last night. LBM yesterday morning, normal color and consistency per pt. Though he may be constipated d/t pain, took x1 stool softener and 1/2 bottle mag citrates today w no BM. Denies vomiting, diarrhea, hematochezia, dysuria, or hematuria.       Patient Care Team  Primary Care Provider: Jamil Katz APRN    Past Medical History:     Allergies   Allergen Reactions    Promethazine Other (See Comments)     \"you'll have to restrain me\"    Tramadol Confusion     Past Medical History:   Diagnosis Date    Anxiety     Depression     GERD (gastroesophageal reflux disease)      Past Surgical History:   Procedure Laterality Date    SKIN CANCER EXCISION      6 procedures for skin cancer removal     Family History   Problem Relation Age of Onset    Heart disease Other     Skin cancer Other        Home Medications:  Prior to Admission medications    Medication Sig Start Date End Date Taking? Authorizing Provider   omeprazole (priLOSEC) 40 MG capsule Take 1 capsule by mouth Daily.    Provider, MD Pedro   acetaminophen (TYLENOL) 500 MG tablet Take 1 tablet by mouth Every 6 (Six) Hours As Needed for Mild Pain.  Patient not taking: Reported on 9/28/2023 4/7/23 2/13/24  Bebo Browne MD   mupirocin (BACTROBAN) 2 % nasal ointment 1 application  into the nostril(s) as directed by provider 2 (Two) Times a Day. 9/28/23 2/13/24  Rosa Mathur APRN        Social History:   Social History     Tobacco Use    Smoking status: Some Days     Types: Electronic Cigarette    Smokeless tobacco: Never   Vaping Use    Vaping Use: Every day    " "Substances: Nicotine, Flavoring    Devices: Disposable    Passive vaping exposure: Yes   Substance Use Topics    Alcohol use: Never    Drug use: Never         Review of Systems:  Review of Systems   Constitutional:  Negative for chills and fever.   HENT:  Negative for congestion, ear pain and sore throat.    Eyes:  Negative for pain.   Respiratory:  Negative for cough, chest tightness and shortness of breath.    Cardiovascular:  Negative for chest pain.   Gastrointestinal:  Positive for abdominal pain and nausea. Negative for blood in stool, diarrhea and vomiting.   Genitourinary:  Negative for flank pain and hematuria.   Musculoskeletal:  Negative for joint swelling.   Skin:  Negative for pallor.   Neurological:  Negative for seizures and headaches.   All other systems reviewed and are negative.       Physical Exam:  /75 (BP Location: Left arm, Patient Position: Sitting)   Pulse 91   Temp 97.9 °F (36.6 °C) (Oral)   Resp 20   Ht 167.6 cm (66\")   Wt 66.2 kg (146 lb)   SpO2 96%   BMI 23.57 kg/m²     Physical Exam  Vitals and nursing note reviewed.   Constitutional:       General: He is not in acute distress.     Appearance: Normal appearance. He is not toxic-appearing.   HENT:      Head: Normocephalic and atraumatic.      Mouth/Throat:      Mouth: Mucous membranes are moist.   Eyes:      General: No scleral icterus.  Cardiovascular:      Rate and Rhythm: Normal rate and regular rhythm.      Pulses: Normal pulses.      Heart sounds: Normal heart sounds.   Pulmonary:      Effort: Pulmonary effort is normal. No respiratory distress.      Breath sounds: Normal breath sounds.   Abdominal:      General: Abdomen is flat. Bowel sounds are normal. There is no distension.      Palpations: Abdomen is soft.      Tenderness: There is no abdominal tenderness in the right upper quadrant. There is guarding. There is no rebound. Positive signs include Gordillo's sign. Negative signs include McBurney's sign. "   Musculoskeletal:         General: Normal range of motion.      Cervical back: Normal range of motion and neck supple.   Skin:     General: Skin is warm and dry.   Neurological:      Mental Status: He is alert and oriented to person, place, and time. Mental status is at baseline.              Procedures:  Procedures      Medical Decision Making:      Comorbidities that affect care:    GERD    External Notes reviewed:    Previous Clinic Note: 9/28/23 [facility]      The following orders were placed and all results were independently analyzed by me:  Orders Placed This Encounter   Procedures    XR Chest 1 View    CT Abdomen Pelvis With Contrast    Rosie Draw    High Sensitivity Troponin T    Comprehensive Metabolic Panel    Lipase    BNP    Magnesium    CBC Auto Differential    Ethanol    NPO Diet NPO Type: Strict NPO    Undress & Gown    Continuous Pulse Oximetry    Hospitalist (on-call MD unless specified)    Oxygen Therapy- Nasal Cannula; Titrate 1-6 LPM Per SpO2; 90 - 95%    Insert Peripheral IV    CBC & Differential    Green Top (Gel)    Lavender Top    Gold Top - SST    Light Blue Top       Medications Given in the Emergency Department:  Medications   sodium chloride 0.9 % flush 10 mL (has no administration in time range)   aspirin chewable tablet 324 mg (324 mg Oral Given 2/13/24 1348)   sodium chloride 0.9 % bolus 1,000 mL (0 mL Intravenous Stopped 2/13/24 1601)   ondansetron (ZOFRAN) injection 4 mg (4 mg Intravenous Given 2/13/24 1500)   HYDROmorphone (DILAUDID) injection 0.5 mg (0.5 mg Intravenous Given 2/13/24 1500)   iopamidol (ISOVUE-370) 76 % injection 100 mL (100 mL Intravenous Given 2/13/24 1456)   morphine injection 4 mg (4 mg Intravenous Given 2/13/24 1552)        ED Course:         Labs:    Lab Results (last 24 hours)       Procedure Component Value Units Date/Time    High Sensitivity Troponin T [[id number]]  (Normal) Collected: 02/13/24 1354    Specimen: Blood Updated: 02/13/24 1418     HS Troponin  T <6 ng/L     Narrative:      High Sensitive Troponin T Reference Range:  <14.0 ng/L- Negative Female for AMI  <22.0 ng/L- Negative Male for AMI  >=14 - Abnormal Female indicating possible myocardial injury.  >=22 - Abnormal Male indicating possible myocardial injury.   Clinicians would have to utilize clinical acumen, EKG, Troponin, and serial changes to determine if it is an Acute Myocardial Infarction or myocardial injury due to an underlying chronic condition.         CBC & Differential [[id number]]  (Abnormal) Collected: 02/13/24 1354    Specimen: Blood Updated: 02/13/24 1359    Narrative:      The following orders were created for panel order CBC & Differential.  Procedure                               Abnormality         Status                     ---------                               -----------         ------                     CBC Auto Differential[[id number]]        Abnormal            Final result                 Please view results for these tests on the individual orders.    Comprehensive Metabolic Panel [[id number]]  (Abnormal) Collected: 02/13/24 1354    Specimen: Blood Updated: 02/13/24 1418     Glucose 121 mg/dL      BUN 10 mg/dL      Creatinine 1.04 mg/dL      Sodium 137 mmol/L      Potassium 4.3 mmol/L      Chloride 100 mmol/L      CO2 24.7 mmol/L      Calcium 9.7 mg/dL      Total Protein 8.1 g/dL      Albumin 4.8 g/dL      ALT (SGPT) 31 U/L      AST (SGOT) 33 U/L      Alkaline Phosphatase 76 U/L      Total Bilirubin 1.1 mg/dL      Globulin 3.3 gm/dL      A/G Ratio 1.5 g/dL      BUN/Creatinine Ratio 9.6     Anion Gap 12.3 mmol/L      eGFR 88.0 mL/min/1.73     Narrative:      GFR Normal >60  Chronic Kidney Disease <60  Kidney Failure <15      Lipase [[id number]]  (Abnormal) Collected: 02/13/24 1354    Specimen: Blood Updated: 02/13/24 1427     Lipase 1,621 U/L     BNP [[id number]]  (Normal) Collected: 02/13/24 1354    Specimen: Blood Updated: 02/13/24 1416     proBNP <36.0 pg/mL     Narrative:       This assay is used as an aid in the diagnosis of individuals suspected of having heart failure. It can be used as an aid in the diagnosis of acute decompensated heart failure (ADHF) in patients presenting with signs and symptoms of ADHF to the emergency department (ED). In addition, NT-proBNP of <300 pg/mL indicates ADHF is not likely.    Age Range Result Interpretation  NT-proBNP Concentration (pg/mL:      <50             Positive            >450                   Gray                 300-450                    Negative             <300    50-75           Positive            >900                  Gray                300-900                  Negative            <300      >75             Positive            >1800                  Gray                300-1800                  Negative            <300    Magnesium [[id number]]  (Normal) Collected: 02/13/24 1354    Specimen: Blood Updated: 02/13/24 1418     Magnesium 2.4 mg/dL     CBC Auto Differential [[id number]]  (Abnormal) Collected: 02/13/24 1354    Specimen: Blood Updated: 02/13/24 1359     WBC 13.19 10*3/mm3      RBC 5.47 10*6/mm3      Hemoglobin 15.8 g/dL      Hematocrit 47.6 %      MCV 87.0 fL      MCH 28.9 pg      MCHC 33.2 g/dL      RDW 12.5 %      RDW-SD 39.7 fl      MPV 9.6 fL      Platelets 212 10*3/mm3      Neutrophil % 88.3 %      Lymphocyte % 6.9 %      Monocyte % 4.1 %      Eosinophil % 0.1 %      Basophil % 0.3 %      Immature Grans % 0.3 %      Neutrophils, Absolute 11.65 10*3/mm3      Lymphocytes, Absolute 0.91 10*3/mm3      Monocytes, Absolute 0.54 10*3/mm3      Eosinophils, Absolute 0.01 10*3/mm3      Basophils, Absolute 0.04 10*3/mm3      Immature Grans, Absolute 0.04 10*3/mm3      nRBC 0.0 /100 WBC     Ethanol [[id number]] Collected: 02/13/24 1354    Specimen: Blood Updated: 02/13/24 1437     Ethanol <10 mg/dL      Ethanol % <0.010 %     Narrative:      Ethanol (Plasma)  <10 Essentially Negative    Toxic Concentrations           mg/dL    Flushing,  slowing of reflexes    Impaired visual activity         Depression of CNS              >100  Possible Coma                  >300                Imaging:    CT Abdomen Pelvis With Contrast    Result Date: 2/13/2024  PROCEDURE: CT ABDOMEN PELVIS W CONTRAST  COMPARISON: None  INDICATIONS: Pancreatitis, upper abdominal pain, abdominal cramping  TECHNIQUE: After obtaining the patient's consent, CT images were created with non-ionic intravenous contrast material.   PROTOCOL:   Standard imaging protocol performed    RADIATION:   DLP: 407.9mGy*cm   Automated exposure control was utilized to minimize radiation dose. CONTRAST: 100cc Isovue 370 I.V.  FINDINGS:    Lung bases:  Dependent opacities at the lung bases are compatible with atelectasis.  No free air noted below the diaphragm.  There is peripancreatic stranding and fluid noted within the central portions of the mesentery.  The pancreas appears to enhance unremarkably.  No well-defined rim enhancing fluid collections are noted to suggest abscess or pseudocyst formation at this time.  The mesenteric vessels opacify unremarkably.  Portal venous system opacifies unremarkably.  The liver, gallbladder, spleen, kidneys and adrenal glands are unremarkable in appearance  Organs:  Stomach is decompressed and otherwise grossly unremarkable in appearance.  Small bowel demonstrates no evidence of obstruction.  The ileocecal valve is grossly unremarkable in appearance there is diverticulosis without evidence of diverticulitis  GI tract:  Small amount of fluid is noted within the pelvis.  The urinary bladder and prostate are unremarkable.  Pelvis:  Aorta is normal in caliber.  No suspicious retroperitoneal adenopathy  Retroperitoneum:  No destructive bone lesion  Bones and soft tissues:        1. Inflammatory changes centered around the pancreas compatible with acute pancreatitis.  No evidence of abscess or pseudocyst formation 2. No acute abnormality otherwise noted.      CARMEN RASMUSSEN MD       Electronically Signed and Approved By: CARMEN RASMUSSEN MD on 2/13/2024 at 15:16             XR Chest 1 View    Result Date: 2/13/2024  PROCEDURE: XR CHEST 1 VW  COMPARISON: None  INDICATIONS: Chest Pain Triage Protocol  FINDINGS:  The lungs are clear bilaterally.  The cardiac and mediastinal silhouettes appear normal.  No effusion is identified.        1. No acute cardiopulmonary disease.       Anish Mccabe M.D.       Electronically Signed and Approved By: Ainsh Mccabe M.D. on 2/13/2024 at 14:00                Differential Diagnosis and Discussion:    Abdominal Pain: Based on the patient's signs and symptoms, I considered abdominal aortic aneurysm, small bowel obstruction, pancreatitis, acute cholecystitis, acute appendecitis, peptic ulcer disease, gastritis, colitis, endocrine disorders, irritable bowel syndrome and other differential diagnosis an etiology of the patient's abdominal pain.    All labs were reviewed and interpreted by me.  All X-rays impressions were independently interpreted by me.  CT scan radiology impression was interpreted by me.    MDM     Amount and/or Complexity of Data Reviewed  Clinical lab tests: reviewed  Tests in the radiology section of CPT®: reviewed  Tests in the medicine section of CPT®: reviewed                 Patient Care Considerations:    CONSULT: I considered consulting Gastroenterology, however there is no emergent indication for GI consultation for the treatment of pancreatitis.      Consultants/Shared Management Plan:    Hospitalist: I have discussed the case with Dr. Ruiz who agrees to accept the patient for admission.    Social Determinants of Health:    Patient is independent, reliable, and has access to care.       Disposition and Care Coordination:    Admit:   Through independent evaluation of the patient's history, physical, and imperical data, the patient meets criteria for inpatient admission to the hospital.        Final diagnoses:    Acute pancreatitis without infection or necrosis, unspecified pancreatitis type        ED Disposition       ED Disposition   Decision to Admit    Condition   --    Comment   --               This medical record created using voice recognition software.            Alberto Voss MD  02/13/24 7927

## 2024-02-14 ENCOUNTER — APPOINTMENT (OUTPATIENT)
Dept: ULTRASOUND IMAGING | Facility: HOSPITAL | Age: 49
DRG: 439 | End: 2024-02-14
Payer: OTHER GOVERNMENT

## 2024-02-14 LAB
ALBUMIN SERPL-MCNC: 3.6 G/DL (ref 3.5–5.2)
ALBUMIN/GLOB SERPL: 1.5 G/DL
ALP SERPL-CCNC: 55 U/L (ref 39–117)
ALT SERPL W P-5'-P-CCNC: 18 U/L (ref 1–41)
ANION GAP SERPL CALCULATED.3IONS-SCNC: 9.3 MMOL/L (ref 5–15)
AST SERPL-CCNC: 19 U/L (ref 1–40)
BASOPHILS # BLD AUTO: 0.04 10*3/MM3 (ref 0–0.2)
BASOPHILS NFR BLD AUTO: 0.4 % (ref 0–1.5)
BILIRUB SERPL-MCNC: 1.2 MG/DL (ref 0–1.2)
BUN SERPL-MCNC: 8 MG/DL (ref 6–20)
BUN/CREAT SERPL: 9.3 (ref 7–25)
CALCIUM SPEC-SCNC: 8.2 MG/DL (ref 8.6–10.5)
CHLORIDE SERPL-SCNC: 103 MMOL/L (ref 98–107)
CHOLEST SERPL-MCNC: 115 MG/DL (ref 0–200)
CO2 SERPL-SCNC: 21.7 MMOL/L (ref 22–29)
CREAT SERPL-MCNC: 0.86 MG/DL (ref 0.76–1.27)
DEPRECATED RDW RBC AUTO: 41.1 FL (ref 37–54)
EGFRCR SERPLBLD CKD-EPI 2021: 106.1 ML/MIN/1.73
EOSINOPHIL # BLD AUTO: 0.02 10*3/MM3 (ref 0–0.4)
EOSINOPHIL NFR BLD AUTO: 0.2 % (ref 0.3–6.2)
ERYTHROCYTE [DISTWIDTH] IN BLOOD BY AUTOMATED COUNT: 12.7 % (ref 12.3–15.4)
GLOBULIN UR ELPH-MCNC: 2.4 GM/DL
GLUCOSE SERPL-MCNC: 90 MG/DL (ref 65–99)
HCT VFR BLD AUTO: 40.4 % (ref 37.5–51)
HDLC SERPL-MCNC: 36 MG/DL (ref 40–60)
HGB BLD-MCNC: 12.9 G/DL (ref 13–17.7)
IMM GRANULOCYTES # BLD AUTO: 0.04 10*3/MM3 (ref 0–0.05)
IMM GRANULOCYTES NFR BLD AUTO: 0.4 % (ref 0–0.5)
LDLC SERPL CALC-MCNC: 64 MG/DL (ref 0–100)
LDLC/HDLC SERPL: 1.78 {RATIO}
LYMPHOCYTES # BLD AUTO: 1.68 10*3/MM3 (ref 0.7–3.1)
LYMPHOCYTES NFR BLD AUTO: 16.3 % (ref 19.6–45.3)
MAGNESIUM SERPL-MCNC: 2.2 MG/DL (ref 1.6–2.6)
MCH RBC QN AUTO: 28.3 PG (ref 26.6–33)
MCHC RBC AUTO-ENTMCNC: 31.9 G/DL (ref 31.5–35.7)
MCV RBC AUTO: 88.6 FL (ref 79–97)
MONOCYTES # BLD AUTO: 0.55 10*3/MM3 (ref 0.1–0.9)
MONOCYTES NFR BLD AUTO: 5.3 % (ref 5–12)
NEUTROPHILS NFR BLD AUTO: 7.97 10*3/MM3 (ref 1.7–7)
NEUTROPHILS NFR BLD AUTO: 77.4 % (ref 42.7–76)
NRBC BLD AUTO-RTO: 0 /100 WBC (ref 0–0.2)
PLATELET # BLD AUTO: 160 10*3/MM3 (ref 140–450)
PMV BLD AUTO: 9.9 FL (ref 6–12)
POTASSIUM SERPL-SCNC: 4.2 MMOL/L (ref 3.5–5.2)
PROT SERPL-MCNC: 6 G/DL (ref 6–8.5)
RBC # BLD AUTO: 4.56 10*6/MM3 (ref 4.14–5.8)
SODIUM SERPL-SCNC: 134 MMOL/L (ref 136–145)
TRIGL SERPL-MCNC: 75 MG/DL (ref 0–150)
VLDLC SERPL-MCNC: 15 MG/DL (ref 5–40)
WBC NRBC COR # BLD AUTO: 10.3 10*3/MM3 (ref 3.4–10.8)

## 2024-02-14 PROCEDURE — 97161 PT EVAL LOW COMPLEX 20 MIN: CPT

## 2024-02-14 PROCEDURE — 99232 SBSQ HOSP IP/OBS MODERATE 35: CPT | Performed by: INTERNAL MEDICINE

## 2024-02-14 PROCEDURE — 83735 ASSAY OF MAGNESIUM: CPT | Performed by: FAMILY MEDICINE

## 2024-02-14 PROCEDURE — 36415 COLL VENOUS BLD VENIPUNCTURE: CPT | Performed by: FAMILY MEDICINE

## 2024-02-14 PROCEDURE — 80061 LIPID PANEL: CPT | Performed by: INTERNAL MEDICINE

## 2024-02-14 PROCEDURE — 25010000002 HYDROMORPHONE 1 MG/ML SOLUTION: Performed by: FAMILY MEDICINE

## 2024-02-14 PROCEDURE — 76705 ECHO EXAM OF ABDOMEN: CPT

## 2024-02-14 PROCEDURE — 80053 COMPREHEN METABOLIC PANEL: CPT | Performed by: FAMILY MEDICINE

## 2024-02-14 PROCEDURE — 25810000003 SODIUM CHLORIDE 0.9 % SOLUTION: Performed by: FAMILY MEDICINE

## 2024-02-14 PROCEDURE — 85025 COMPLETE CBC W/AUTO DIFF WBC: CPT | Performed by: FAMILY MEDICINE

## 2024-02-14 PROCEDURE — 25010000002 HEPARIN (PORCINE) PER 1000 UNITS: Performed by: FAMILY MEDICINE

## 2024-02-14 RX ADMIN — HEPARIN SODIUM 5000 UNITS: 5000 INJECTION INTRAVENOUS; SUBCUTANEOUS at 05:39

## 2024-02-14 RX ADMIN — HYDROMORPHONE HYDROCHLORIDE 0.5 MG: 1 INJECTION, SOLUTION INTRAMUSCULAR; INTRAVENOUS; SUBCUTANEOUS at 19:56

## 2024-02-14 RX ADMIN — HYDROMORPHONE HYDROCHLORIDE 0.5 MG: 1 INJECTION, SOLUTION INTRAMUSCULAR; INTRAVENOUS; SUBCUTANEOUS at 03:39

## 2024-02-14 RX ADMIN — SODIUM CHLORIDE 150 ML/HR: 9 INJECTION, SOLUTION INTRAVENOUS at 17:10

## 2024-02-14 RX ADMIN — SODIUM CHLORIDE 150 ML/HR: 9 INJECTION, SOLUTION INTRAVENOUS at 10:29

## 2024-02-14 RX ADMIN — HYDROMORPHONE HYDROCHLORIDE 0.5 MG: 1 INJECTION, SOLUTION INTRAMUSCULAR; INTRAVENOUS; SUBCUTANEOUS at 08:26

## 2024-02-14 RX ADMIN — Medication 10 ML: at 19:56

## 2024-02-14 RX ADMIN — SODIUM CHLORIDE 150 ML/HR: 9 INJECTION, SOLUTION INTRAVENOUS at 03:39

## 2024-02-14 RX ADMIN — Medication 10 ML: at 11:08

## 2024-02-14 RX ADMIN — HYDROMORPHONE HYDROCHLORIDE 0.5 MG: 1 INJECTION, SOLUTION INTRAMUSCULAR; INTRAVENOUS; SUBCUTANEOUS at 11:08

## 2024-02-14 RX ADMIN — Medication 10 ML: at 08:26

## 2024-02-14 RX ADMIN — HYDROMORPHONE HYDROCHLORIDE 0.5 MG: 1 INJECTION, SOLUTION INTRAMUSCULAR; INTRAVENOUS; SUBCUTANEOUS at 01:12

## 2024-02-14 RX ADMIN — HYDROMORPHONE HYDROCHLORIDE 0.5 MG: 1 INJECTION, SOLUTION INTRAMUSCULAR; INTRAVENOUS; SUBCUTANEOUS at 17:10

## 2024-02-14 RX ADMIN — HEPARIN SODIUM 5000 UNITS: 5000 INJECTION INTRAVENOUS; SUBCUTANEOUS at 22:22

## 2024-02-14 RX ADMIN — HYDROMORPHONE HYDROCHLORIDE 0.5 MG: 1 INJECTION, SOLUTION INTRAMUSCULAR; INTRAVENOUS; SUBCUTANEOUS at 23:49

## 2024-02-14 RX ADMIN — HEPARIN SODIUM 5000 UNITS: 5000 INJECTION INTRAVENOUS; SUBCUTANEOUS at 14:25

## 2024-02-14 RX ADMIN — HYDROMORPHONE HYDROCHLORIDE 0.5 MG: 1 INJECTION, SOLUTION INTRAMUSCULAR; INTRAVENOUS; SUBCUTANEOUS at 14:25

## 2024-02-14 NOTE — PLAN OF CARE
Goal Outcome Evaluation:  Plan of Care Reviewed With: patient        Progress: no change  Outcome Evaluation: patient is alert and oriented x4 and on 1LNC. new admit this shift from ED. prn pain medication given throughout shift for pain in the right upper and lower quadrants. continous fluids continued per mar. belongings listed in chart, bed alarm placed, educated patient on the risks for falls and to ask for help before going to the bathroom. no other issues at this time.

## 2024-02-14 NOTE — PROGRESS NOTES
[facility]   Hospitalist Progress Note  Date: 2024  Patient Name: Adalberto Toribio  : 1975  MRN: [id number]  Date of admission: 2024    Subjective   Subjective     Chief Complaint:   Abdominal pain    Summary:   Patient is a 48-year-old male with past medical history of anxiety depression and GERD. He presented to the emergency department with a 3-day history of increasing epigastric abdominal pain. Patient says that initially the pain was worse after he ate but has intensified over the last 24 hours to be constant. He rates it as a 7 out of 10 with radiation into his back. Patient denies alcohol use. He has had no abdominal surgeries. Here in the ED his lipase was elevated greater than 1600. A CT scan of the abdomen and pelvis was obtained which showed inflammatory change centered around the pancreas compatible with acute pancreatitis. There is no evidence of pseudocyst or abscess formation. Initially the ER doctor had planned on discharging home with pain medications however the patient's pain has quickly returned after just a couple of hours. He is also having a difficult time keeping down liquids. Because of this the ED doctor asked that we admit for further evaluation and treatment     Interval Followup:   Over the last 24 hours patient states that his abdominal pain is improved, he has tolerated water orally, he is awaiting right upper quadrant ultrasound to assess for gallstones, he is tolerating IV fluids    Objective   Objective     Vitals:   Temp:  [97.9 °F (36.6 °C)-98.4 °F (36.9 °C)] 97.9 °F (36.6 °C)  Heart Rate:  [71-91] 72  Resp:  [18-20] 20  BP: (116-145)/(66-82) 123/68  Flow (L/min):  [1] 1    Physical Exam   GEN: No acute distress  HEENT: Moist mucous membranes  LUNGS: Equal chest rise bilaterally  CARDIAC: Regular rate and rhythm  NEURO: Moving all 4 extremities spontaneously  SKIN: No obvious breakdown    Result Review    Result Review:  I have personally reviewed the  results as below  [x]  Laboratory CBC, CMP personally reviewed  CBC          2/13/2024    13:54 2/14/2024    04:45   CBC   WBC 13.19  10.30    RBC 5.47  4.56    Hemoglobin 15.8  12.9    Hematocrit 47.6  40.4    MCV 87.0  88.6    MCH 28.9  28.3    MCHC 33.2  31.9    RDW 12.5  12.7    Platelets 212  160      CMP          2/13/2024    13:54 2/14/2024    04:45   CMP   Glucose 121  90    BUN 10  8    Creatinine 1.04  0.86    EGFR 88.0  106.1    Sodium 137  134    Potassium 4.3  4.2    Chloride 100  103    Calcium 9.7  8.2    Total Protein 8.1  6.0    Albumin 4.8  3.6    Globulin 3.3  2.4    Total Bilirubin 1.1  1.2    Alkaline Phosphatase 76  55    AST (SGOT) 33  19    ALT (SGPT) 31  18    Albumin/Globulin Ratio 1.5  1.5    BUN/Creatinine Ratio 9.6  9.3    Anion Gap 12.3  9.3      []  Microbiology  []  Radiology  []  EKG/Telemetry   []  Cardiology/Vascular   []  Pathology  []  Old records  []  Other:    Assessment & Plan   Assessment / Plan     Assessment:  Acute pancreatitis  Leukocytosis  Anxiety  Depression    Plan:  Patient admitted to the hospital for further workup and management of above  Continue aggressive IV fluids  CT scan of the abdomen personally reviewed, consistent with pancreatitis, no gallstones identified  Check ultrasound of the gallbladder today  Patient adamant that he does not drink alcohol  Check lipid panel to ensure this is not because of pancreatitis  N.p.o. for now pending ultrasound, start clear liquids following  Continue IV fluids 150 cc/h  Continue IV Dilaudid for pain control  CBC, CMP reviewed  Repeat CBC, CMP, mag and Phos in a.m.     Reviewed patients labs and imaging, and discussed with patient and nurse at bedside.    DVT prophylaxis:  Medical DVT prophylaxis orders are present.        CODE STATUS:   Code Status (Patient has no pulse and is not breathing): CPR (Attempt to Resuscitate)  Medical Interventions (Patient has pulse or is breathing): Full Support        Electronically signed  by Christopher Umaña MD, 02/14/24, 10:24 AM EST.

## 2024-02-14 NOTE — PLAN OF CARE
Goal Outcome Evaluation:  Plan of Care Reviewed With: patient, spouse        Progress: improving  Outcome Evaluation: c/o continued abd pain in upper abdomen mostly on right side per patient statement, prn pain mgmt per order at patient request. denies nausea this shift but concerns will have nausea again, educated on prn nausea med available if patient needs and emesis bag given at pt request. continued iv fluids per order. still needing o2 via nc at 1-2 liters d/t noted at 86% on room air this morning after ambulating to bathroom and back to bed. o2 extension line added and patient educated on keeping o2 on when going to bathroom. clear liquid diet initiated after ultrasound done with order to adv as tolerated. clear liquids tolerated well, advancing to full liquids for dinner with possibility of soft bland for breakfast if full liquids tolerated. no new issues/needs noted at this time.

## 2024-02-14 NOTE — PLAN OF CARE
Goal Outcome Evaluation:  Plan of Care Reviewed With: patient        Progress: no change  Outcome Evaluation: Pt presents with no physical limitations that impede his ability to return home independently or with assist from his S/O as needed. He was encouraged to ambulate throughout the day as tolerated with someone present for supervision. He will be discharged from PT caseload.      Anticipated Discharge Disposition (PT): home

## 2024-02-15 LAB
ALBUMIN SERPL-MCNC: 3.4 G/DL (ref 3.5–5.2)
ALBUMIN/GLOB SERPL: 1.3 G/DL
ALP SERPL-CCNC: 54 U/L (ref 39–117)
ALT SERPL W P-5'-P-CCNC: 13 U/L (ref 1–41)
ANION GAP SERPL CALCULATED.3IONS-SCNC: 10.9 MMOL/L (ref 5–15)
AST SERPL-CCNC: 13 U/L (ref 1–40)
BASOPHILS # BLD AUTO: 0.04 10*3/MM3 (ref 0–0.2)
BASOPHILS NFR BLD AUTO: 0.4 % (ref 0–1.5)
BILIRUB SERPL-MCNC: 1 MG/DL (ref 0–1.2)
BUN SERPL-MCNC: 7 MG/DL (ref 6–20)
BUN/CREAT SERPL: 8.4 (ref 7–25)
CA-I BLDA-SCNC: 1.16 MMOL/L (ref 1.13–1.32)
CALCIUM SPEC-SCNC: 8.5 MG/DL (ref 8.6–10.5)
CHLORIDE SERPL-SCNC: 101 MMOL/L (ref 98–107)
CO2 SERPL-SCNC: 23.1 MMOL/L (ref 22–29)
CREAT SERPL-MCNC: 0.83 MG/DL (ref 0.76–1.27)
DEPRECATED RDW RBC AUTO: 39.9 FL (ref 37–54)
EGFRCR SERPLBLD CKD-EPI 2021: 107.3 ML/MIN/1.73
EOSINOPHIL # BLD AUTO: 0.05 10*3/MM3 (ref 0–0.4)
EOSINOPHIL NFR BLD AUTO: 0.5 % (ref 0.3–6.2)
ERYTHROCYTE [DISTWIDTH] IN BLOOD BY AUTOMATED COUNT: 12.2 % (ref 12.3–15.4)
GLOBULIN UR ELPH-MCNC: 2.7 GM/DL
GLUCOSE SERPL-MCNC: 98 MG/DL (ref 65–99)
HCT VFR BLD AUTO: 38.3 % (ref 37.5–51)
HGB BLD-MCNC: 12.4 G/DL (ref 13–17.7)
IMM GRANULOCYTES # BLD AUTO: 0.05 10*3/MM3 (ref 0–0.05)
IMM GRANULOCYTES NFR BLD AUTO: 0.5 % (ref 0–0.5)
LYMPHOCYTES # BLD AUTO: 0.98 10*3/MM3 (ref 0.7–3.1)
LYMPHOCYTES NFR BLD AUTO: 10.1 % (ref 19.6–45.3)
MAGNESIUM SERPL-MCNC: 2.2 MG/DL (ref 1.6–2.6)
MCH RBC QN AUTO: 28.7 PG (ref 26.6–33)
MCHC RBC AUTO-ENTMCNC: 32.4 G/DL (ref 31.5–35.7)
MCV RBC AUTO: 88.7 FL (ref 79–97)
MONOCYTES # BLD AUTO: 0.76 10*3/MM3 (ref 0.1–0.9)
MONOCYTES NFR BLD AUTO: 7.8 % (ref 5–12)
NEUTROPHILS NFR BLD AUTO: 7.86 10*3/MM3 (ref 1.7–7)
NEUTROPHILS NFR BLD AUTO: 80.7 % (ref 42.7–76)
NRBC BLD AUTO-RTO: 0 /100 WBC (ref 0–0.2)
PHOSPHATE SERPL-MCNC: 2.1 MG/DL (ref 2.5–4.5)
PLATELET # BLD AUTO: 137 10*3/MM3 (ref 140–450)
PMV BLD AUTO: 9.7 FL (ref 6–12)
POTASSIUM SERPL-SCNC: 4 MMOL/L (ref 3.5–5.2)
PROT SERPL-MCNC: 6.1 G/DL (ref 6–8.5)
RBC # BLD AUTO: 4.32 10*6/MM3 (ref 4.14–5.8)
SODIUM SERPL-SCNC: 135 MMOL/L (ref 136–145)
WBC NRBC COR # BLD AUTO: 9.74 10*3/MM3 (ref 3.4–10.8)

## 2024-02-15 PROCEDURE — 84100 ASSAY OF PHOSPHORUS: CPT | Performed by: INTERNAL MEDICINE

## 2024-02-15 PROCEDURE — 99254 IP/OBS CNSLTJ NEW/EST MOD 60: CPT | Performed by: INTERNAL MEDICINE

## 2024-02-15 PROCEDURE — 85025 COMPLETE CBC W/AUTO DIFF WBC: CPT | Performed by: INTERNAL MEDICINE

## 2024-02-15 PROCEDURE — 82784 ASSAY IGA/IGD/IGG/IGM EACH: CPT | Performed by: INTERNAL MEDICINE

## 2024-02-15 PROCEDURE — 94799 UNLISTED PULMONARY SVC/PX: CPT

## 2024-02-15 PROCEDURE — 99233 SBSQ HOSP IP/OBS HIGH 50: CPT | Performed by: INTERNAL MEDICINE

## 2024-02-15 PROCEDURE — 82787 IGG 1 2 3 OR 4 EACH: CPT | Performed by: INTERNAL MEDICINE

## 2024-02-15 PROCEDURE — 94761 N-INVAS EAR/PLS OXIMETRY MLT: CPT

## 2024-02-15 PROCEDURE — 25010000002 HYDROMORPHONE 1 MG/ML SOLUTION: Performed by: FAMILY MEDICINE

## 2024-02-15 PROCEDURE — 83735 ASSAY OF MAGNESIUM: CPT | Performed by: INTERNAL MEDICINE

## 2024-02-15 PROCEDURE — 80053 COMPREHEN METABOLIC PANEL: CPT | Performed by: INTERNAL MEDICINE

## 2024-02-15 PROCEDURE — 25810000003 SODIUM CHLORIDE 0.9 % SOLUTION: Performed by: FAMILY MEDICINE

## 2024-02-15 PROCEDURE — 82330 ASSAY OF CALCIUM: CPT | Performed by: INTERNAL MEDICINE

## 2024-02-15 PROCEDURE — 25010000002 HEPARIN (PORCINE) PER 1000 UNITS: Performed by: FAMILY MEDICINE

## 2024-02-15 RX ORDER — AMOXICILLIN 250 MG
2 CAPSULE ORAL 2 TIMES DAILY
Status: DISCONTINUED | OUTPATIENT
Start: 2024-02-15 | End: 2024-02-18 | Stop reason: HOSPADM

## 2024-02-15 RX ORDER — HYDROCODONE BITARTRATE AND ACETAMINOPHEN 10; 325 MG/1; MG/1
1 TABLET ORAL EVERY 4 HOURS PRN
Status: DISCONTINUED | OUTPATIENT
Start: 2024-02-15 | End: 2024-02-16

## 2024-02-15 RX ORDER — HYDROCODONE BITARTRATE AND ACETAMINOPHEN 5; 325 MG/1; MG/1
1 TABLET ORAL EVERY 6 HOURS PRN
Status: DISCONTINUED | OUTPATIENT
Start: 2024-02-15 | End: 2024-02-16

## 2024-02-15 RX ADMIN — DOCUSATE SODIUM 50MG AND SENNOSIDES 8.6MG 2 TABLET: 8.6; 5 TABLET, FILM COATED ORAL at 21:19

## 2024-02-15 RX ADMIN — HEPARIN SODIUM 5000 UNITS: 5000 INJECTION INTRAVENOUS; SUBCUTANEOUS at 21:18

## 2024-02-15 RX ADMIN — SODIUM CHLORIDE 150 ML/HR: 9 INJECTION, SOLUTION INTRAVENOUS at 13:25

## 2024-02-15 RX ADMIN — HYDROMORPHONE HYDROCHLORIDE 0.5 MG: 1 INJECTION, SOLUTION INTRAMUSCULAR; INTRAVENOUS; SUBCUTANEOUS at 08:14

## 2024-02-15 RX ADMIN — SODIUM CHLORIDE 150 ML/HR: 9 INJECTION, SOLUTION INTRAVENOUS at 00:02

## 2024-02-15 RX ADMIN — SODIUM CHLORIDE 150 ML/HR: 9 INJECTION, SOLUTION INTRAVENOUS at 21:18

## 2024-02-15 RX ADMIN — Medication 10 ML: at 21:18

## 2024-02-15 RX ADMIN — HYDROCODONE BITARTRATE AND ACETAMINOPHEN 1 TABLET: 10; 325 TABLET ORAL at 14:53

## 2024-02-15 RX ADMIN — DOCUSATE SODIUM 50MG AND SENNOSIDES 8.6MG 2 TABLET: 8.6; 5 TABLET, FILM COATED ORAL at 09:26

## 2024-02-15 RX ADMIN — HEPARIN SODIUM 5000 UNITS: 5000 INJECTION INTRAVENOUS; SUBCUTANEOUS at 05:44

## 2024-02-15 RX ADMIN — HYDROMORPHONE HYDROCHLORIDE 0.5 MG: 1 INJECTION, SOLUTION INTRAMUSCULAR; INTRAVENOUS; SUBCUTANEOUS at 23:21

## 2024-02-15 RX ADMIN — HYDROMORPHONE HYDROCHLORIDE 0.5 MG: 1 INJECTION, SOLUTION INTRAMUSCULAR; INTRAVENOUS; SUBCUTANEOUS at 17:32

## 2024-02-15 RX ADMIN — Medication 10 ML: at 08:15

## 2024-02-15 RX ADMIN — HYDROCODONE BITARTRATE AND ACETAMINOPHEN 1 TABLET: 5; 325 TABLET ORAL at 09:26

## 2024-02-15 RX ADMIN — HYDROMORPHONE HYDROCHLORIDE 0.5 MG: 1 INJECTION, SOLUTION INTRAMUSCULAR; INTRAVENOUS; SUBCUTANEOUS at 13:10

## 2024-02-15 RX ADMIN — HEPARIN SODIUM 5000 UNITS: 5000 INJECTION INTRAVENOUS; SUBCUTANEOUS at 14:53

## 2024-02-15 RX ADMIN — SODIUM CHLORIDE 150 ML/HR: 9 INJECTION, SOLUTION INTRAVENOUS at 06:43

## 2024-02-15 RX ADMIN — HYDROMORPHONE HYDROCHLORIDE 0.5 MG: 1 INJECTION, SOLUTION INTRAMUSCULAR; INTRAVENOUS; SUBCUTANEOUS at 04:24

## 2024-02-15 RX ADMIN — POTASSIUM & SODIUM PHOSPHATES POWDER PACK 280-160-250 MG 1 PACKET: 280-160-250 PACK at 17:34

## 2024-02-15 RX ADMIN — HYDROCODONE BITARTRATE AND ACETAMINOPHEN 1 TABLET: 10; 325 TABLET ORAL at 19:17

## 2024-02-15 NOTE — CONSULTS
[facility]  Initial Inpatient Consult Note    Referring Provider: Hospitalist    Reason for Consultation: Abdominal pain    Subjective     History of present illness:    48 y.o. male with a past medical history of anxiety, depression, and GERD presented to the emergency department with a 3-day history of increasing epigastric abdominal pain.  Patient states the pain started on Monday and progressively got worse.  His pain is located in the epigastric area and right upper quadrant.  Patient describes pain as constant pressure with intermittent sharp muscle cramp like pain.  He does state the pain medication helps with his pain and eating foods make his pain worse.  Patient rates his pain an 8/10, and the pain is not currently radiating, but has in the past to his back.  Patient is experiencing nausea without vomiting.  Patient states this is the first time he has ever been hospitalized due to pancreatitis, he has never had this issue before.  Patient says he rarely drinks alcohol, 2 beers per month.  He does state that 5 years ago he drank 4-5 bourbons daily for 1 year.  Patient has not had bowel movement since Monday, but reports last bowel movement was consistent and normal for him.  Patient denies vomiting, new medications, hematemesis, melena, hematochezia, and fever.    02/15/2024  AST-normal  ALT-normal  Total bili-normal  Alk phos-normal  Platelets-137  Hgb-12.4  HCT-normal    02/13/2024 CT Abdomen/Pelvis  1. Inflammatory changes centered around the pancreas compatible with acute pancreatitis.  No   evidence of abscess or pseudocyst formation  2. No acute abnormality otherwise noted.      Past Medical History:  Past Medical History:   Diagnosis Date    Anxiety     Depression     GERD (gastroesophageal reflux disease)      Past Surgical History:  Past Surgical History:   Procedure Laterality Date    SKIN CANCER EXCISION      6 procedures for skin cancer removal      Social History:  "  Social History     Tobacco Use    Smoking status: Some Days     Types: Electronic Cigarette    Smokeless tobacco: Never   Substance Use Topics    Alcohol use: Never      Family History:  Family History   Problem Relation Age of Onset    Heart disease Other     Skin cancer Other        Home Meds:  No medications prior to admission.     Current Meds:   heparin (porcine), 5,000 Units, Subcutaneous, Q8H  senna-docusate sodium, 2 tablet, Oral, BID  sodium chloride, 10 mL, Intravenous, Q12H      Allergies:  Allergies   Allergen Reactions    Promethazine Other (See Comments)     \"you'll have to restrain me\" zombie like state with scratching at skin and wild.    Tramadol Itching and Confusion     Review of Systems  Pertinent items are noted in HPI 10 system review is negative except was mentioned HPI    Objective     Vital Signs  Temp:  [97.7 °F (36.5 °C)-99.1 °F (37.3 °C)] 98.4 °F (36.9 °C)  Heart Rate:  [69-84] 75  Resp:  [18-20] 20  BP: (125-165)/(59-71) 138/65  Physical Exam:  General Appearance:    Alert, cooperative, in no acute distress   Head:    Normocephalic, without obvious abnormality, atraumatic   Eyes:          conjunctivae and sclerae normal, no icterus   Throat:   no thrush, oral mucosa moist   Neck:   Supple, no adenopathy   Lungs:     Unlabored breathing    Heart:    Regular rhythm and normal rate    Chest Wall:    No abnormalities observed   Abdomen:     Soft, non distended, tender in RUQ and mid epigastric area, no guarding   Extremities:   no edema, no redness   Skin:   No bruising or rash   Psychiatric:  normal mood and insight     Results Review:   I reviewed the patient's new clinical results.    Results from last 7 days   Lab Units 02/15/24  0903 02/14/24  0445 02/13/24  1354   WBC 10*3/mm3 9.74 10.30 13.19*   HEMOGLOBIN g/dL 12.4* 12.9* 15.8   HEMATOCRIT % 38.3 40.4 47.6   PLATELETS 10*3/mm3 137* 160 212     Results from last 7 days   Lab Units 02/15/24  0903 02/14/24  0445 02/13/24  1354 "   SODIUM mmol/L 135* 134* 137   POTASSIUM mmol/L 4.0 4.2 4.3   CHLORIDE mmol/L 101 103 100   CO2 mmol/L 23.1 21.7* 24.7   BUN mg/dL 7 8 10   CREATININE mg/dL 0.83 0.86 1.04   CALCIUM mg/dL 8.5* 8.2* 9.7   BILIRUBIN mg/dL 1.0 1.2 1.1   ALK PHOS U/L 54 55 76   ALT (SGPT) U/L 13 18 31   AST (SGOT) U/L 13 19 33   GLUCOSE mg/dL 98 90 121*         Lab Results   Lab Value Date/Time    LIPASE 1,621 (H) 02/13/2024 1354       Radiology:  US Gallbladder    Result Date: 2/14/2024    1. Probable adenomyomatosis of the wall of the gallbladder.  No definite stones or sludge.  No sonographic evidence of acute cholecystitis. 2. Common bile duct is normal in caliber.     CARMEN RASMUSSEN MD       Electronically Signed and Approved By: CARMEN RASMUSSEN MD on 2/14/2024 at 13:42             CT Abdomen Pelvis With Contrast    Result Date: 2/13/2024    1. Inflammatory changes centered around the pancreas compatible with acute pancreatitis.  No evidence of abscess or pseudocyst formation 2. No acute abnormality otherwise noted.     CARMEN RASMUSSEN MD       Electronically Signed and Approved By: CARMEN RASMUSSEN MD on 2/13/2024 at 15:16             XR Chest 1 View    Result Date: 2/13/2024    1. No acute cardiopulmonary disease.       Anish Mccabe M.D.       Electronically Signed and Approved By: Anish Mccabe M.D. on 2/13/2024 at 14:00               Assessment & Plan     Acute pancreatitis  I interviewed, examined and evaluated the patient myself I do agree with above.  Patient tells me that the pain has been going on for last 4 days is started in the retrosternal area and then moved to the abdomen it is a constant achy pain radiates to the back.  He never had pain like this before.  Up to this morning he was improving but then after eating regular food the pain started to act up again.  Right now patient is about 5-6 or 10 intensity and pain.    Assessment:    Acute idiopathic pancreatitis.  Does not seem to have any systemic  involvement at this time.  He is tolerating p.o. intake  Plan:   get an MRI MRCP  2.  Will check ionized calcium and IgG subclasses.      I discussed the patients findings and my recommendations with patient.    Electronically signed by IHSAN Medina, 02/15/24, 1:59 PM EST.

## 2024-02-15 NOTE — PROGRESS NOTES
[facility]   Hospitalist Progress Note    Date of admission: 2/13/2024  Patient Name: Adalberto Toribio  1975  Date: 2/15/2024      Subjective     Chief Complaint   Patient presents with    Abdominal Pain    Chest Pain       Interval Followup: Had been tolerating clears and wanted to try advancing diet.  Denies any alcohol, takes intermittent pantoprazole as outpatient and a daily potassium for cramping but otherwise denies any medications.  Later notified by nurse that he was struggling with advance diet from lunch and required IV Dilaudid again.  I have discussed with patient he is willing for additional evaluation with scope if needed    Remote history of smoking, vapes currently, denies any history of COPD is on oxygen  Objective     Vitals:   Temp:  [98.4 °F (36.9 °C)-99.1 °F (37.3 °C)] 98.6 °F (37 °C)  Heart Rate:  [68-84] 79  Resp:  [18-22] 22  BP: (128-165)/(55-71) 134/55  Flow (L/min):  [1-2] 2    Physical Exam  Awake  CTAB apart from slightly diminished in bases on 2 L nasal cannula  RRR  Periumbilical abdominal pain no guarding positive bowel sounds    Result Review:  Vital signs, labs and recent relevant imaging reviewed.        [DISCONTINUED] senna-docusate sodium **AND** polyethylene glycol **AND** bisacodyl **AND** bisacodyl    HYDROcodone-acetaminophen    HYDROcodone-acetaminophen    HYDROmorphone    ondansetron ODT **OR** ondansetron    sodium chloride    sodium chloride    sodium chloride    heparin (porcine), 5,000 Units, Subcutaneous, Q8H  senna-docusate sodium, 2 tablet, Oral, BID  sodium chloride, 10 mL, Intravenous, Q12H        US Gallbladder    Result Date: 2/14/2024    1. Probable adenomyomatosis of the wall of the gallbladder.  No definite stones or sludge.  No sonographic evidence of acute cholecystitis. 2. Common bile duct is normal in caliber.     CARMEN RASMUSSEN MD       Electronically Signed and Approved By: CARMEN RASMUSSEN MD on 2/14/2024 at 13:42             CT Abdomen  Pelvis With Contrast    Result Date: 2/13/2024    1. Inflammatory changes centered around the pancreas compatible with acute pancreatitis.  No evidence of abscess or pseudocyst formation 2. No acute abnormality otherwise noted.     CARMEN RASMUSSEN MD       Electronically Signed and Approved By: CARMEN RASMUSSEN MD on 2/13/2024 at 15:16             XR Chest 1 View    Result Date: 2/13/2024    1. No acute cardiopulmonary disease.       Anish Mccabe M.D.       Electronically Signed and Approved By: Anish Mccabe M.D. on 2/13/2024 at 14:00              Assessment / Plan     Summary: 48M with hx of anxiety/depression, gerd who presented with 3d of worsening abdominal pain and found to have acute pancreatitis.  Unclear source.     Assessment/Plan (clinically significant if listed here)  Acute pancreatitis  Acute hypoxia question atelectasis versus fluid from resuscitation  Leukocytosis, suspect reactive secondary to above  Chronic nicotine vaping  GERD  Anxiety/depression    Continue IV fluids, was unable to advance diet had increased pain requiring additional IV Dilaudid  Add p.o. norco pain medication to try and limit IV dosing, monitor for sedation  Lipids: unremarkable, no alcohol use, LFTs wnl, no gallstones, Ultrasound gallbladder without stones or dilation, adenomyomatosis incidentally noted  given worsening symptoms asked GI to evaluate, check IgG4, discussed with Dr. Alonzo further evaluation with MRI pending  add Bowel regimen  Incentive spirometry for suspect atelectasis, does have extensive nicotine and prior smoking history, if unable to wean off will repeat imaging tomorrow may ultimately need some diuresis.  DVT prophylaxis with heparin in case of need for surgical intervention  PT/OT  Check a.m. CBC, BMP, magnesium, phosphorus  Continue hospitalization at current level of care        DVT prophylaxis:  Medical DVT prophylaxis orders are present.        Code Status (Patient has no pulse and is not  breathing): CPR (Attempt to Resuscitate)  Medical Interventions (Patient has pulse or is breathing): Full Support        CBC          2/13/2024    13:54 2/14/2024    04:45 2/15/2024    09:03   CBC   WBC 13.19  10.30  9.74    RBC 5.47  4.56  4.32    Hemoglobin 15.8  12.9  12.4    Hematocrit 47.6  40.4  38.3    MCV 87.0  88.6  88.7    MCH 28.9  28.3  28.7    MCHC 33.2  31.9  32.4    RDW 12.5  12.7  12.2    Platelets 212  160  137        CMP          2/13/2024    13:54 2/14/2024    04:45 2/15/2024    09:03   CMP   Glucose 121  90  98    BUN 10  8  7    Creatinine 1.04  0.86  0.83    EGFR 88.0  106.1  107.3    Sodium 137  134  135    Potassium 4.3  4.2  4.0    Chloride 100  103  101    Calcium 9.7  8.2  8.5    Total Protein 8.1  6.0  6.1    Albumin 4.8  3.6  3.4    Globulin 3.3  2.4  2.7    Total Bilirubin 1.1  1.2  1.0    Alkaline Phosphatase 76  55  54    AST (SGOT) 33  19  13    ALT (SGPT) 31  18  13    Albumin/Globulin Ratio 1.5  1.5  1.3    BUN/Creatinine Ratio 9.6  9.3  8.4    Anion Gap 12.3  9.3  10.9

## 2024-02-15 NOTE — PLAN OF CARE
Goal Outcome Evaluation:  Plan of Care Reviewed With: patient        Progress: declining  Outcome Evaluation: Patient had to be downgraded to a full liquid diet. Unable to tolerate solid foods, pain was worsening through the day after meals. Minimum supper of full liquid tray ate. Dilauded and oral norco given alternativlely. MD aware and consulted GI. Patient to have an MRI of the abdomen sometime this evening. Patient passing gas, but has not had a BM since 2/12 per patient statement. Sennakot given. VSS

## 2024-02-15 NOTE — PLAN OF CARE
Goal Outcome Evaluation:  Plan of Care Reviewed With: patient        Progress: no change  Outcome Evaluation: patient is alert and oriented x4 and on 1LNC. IV fluids continued per mar. medicated frequently throughout shift for pain in the right upper abdomen. no nausea reported. ambulated to the bathroom with stand-by assist throughout shift. bed alarm set, call light within reach. no other issues at this time.

## 2024-02-16 ENCOUNTER — APPOINTMENT (OUTPATIENT)
Dept: MRI IMAGING | Facility: HOSPITAL | Age: 49
DRG: 439 | End: 2024-02-16
Payer: OTHER GOVERNMENT

## 2024-02-16 ENCOUNTER — TELEPHONE (OUTPATIENT)
Dept: GASTROENTEROLOGY | Facility: CLINIC | Age: 49
End: 2024-02-16
Payer: OTHER GOVERNMENT

## 2024-02-16 LAB
ALBUMIN SERPL-MCNC: 3.5 G/DL (ref 3.5–5.2)
ALBUMIN/GLOB SERPL: 1.1 G/DL
ALP SERPL-CCNC: 57 U/L (ref 39–117)
ALT SERPL W P-5'-P-CCNC: 13 U/L (ref 1–41)
ANION GAP SERPL CALCULATED.3IONS-SCNC: 8.6 MMOL/L (ref 5–15)
AST SERPL-CCNC: 13 U/L (ref 1–40)
BASOPHILS # BLD AUTO: 0.04 10*3/MM3 (ref 0–0.2)
BASOPHILS NFR BLD AUTO: 0.4 % (ref 0–1.5)
BILIRUB SERPL-MCNC: 0.9 MG/DL (ref 0–1.2)
BUN SERPL-MCNC: 7 MG/DL (ref 6–20)
BUN/CREAT SERPL: 10 (ref 7–25)
CALCIUM SPEC-SCNC: 8.7 MG/DL (ref 8.6–10.5)
CHLORIDE SERPL-SCNC: 102 MMOL/L (ref 98–107)
CO2 SERPL-SCNC: 23.4 MMOL/L (ref 22–29)
CREAT SERPL-MCNC: 0.7 MG/DL (ref 0.76–1.27)
DEPRECATED RDW RBC AUTO: 40.6 FL (ref 37–54)
EGFRCR SERPLBLD CKD-EPI 2021: 113 ML/MIN/1.73
EOSINOPHIL # BLD AUTO: 0.08 10*3/MM3 (ref 0–0.4)
EOSINOPHIL NFR BLD AUTO: 0.8 % (ref 0.3–6.2)
ERYTHROCYTE [DISTWIDTH] IN BLOOD BY AUTOMATED COUNT: 12.5 % (ref 12.3–15.4)
GLOBULIN UR ELPH-MCNC: 3.1 GM/DL
GLUCOSE SERPL-MCNC: 93 MG/DL (ref 65–99)
HCT VFR BLD AUTO: 37.7 % (ref 37.5–51)
HGB BLD-MCNC: 12.3 G/DL (ref 13–17.7)
IGG SERPL-MCNC: 778 MG/DL (ref 603–1613)
IGG1 SER-MCNC: 420 MG/DL (ref 248–810)
IGG2 SER-MCNC: 225 MG/DL (ref 130–555)
IGG3 SER-MCNC: 41 MG/DL (ref 15–102)
IGG4 SER-MCNC: 18 MG/DL (ref 2–96)
IGG4 SER-MCNC: 18 MG/DL (ref 2–96)
IMM GRANULOCYTES # BLD AUTO: 0.04 10*3/MM3 (ref 0–0.05)
IMM GRANULOCYTES NFR BLD AUTO: 0.4 % (ref 0–0.5)
LYMPHOCYTES # BLD AUTO: 1.35 10*3/MM3 (ref 0.7–3.1)
LYMPHOCYTES NFR BLD AUTO: 13 % (ref 19.6–45.3)
MAGNESIUM SERPL-MCNC: 2.1 MG/DL (ref 1.6–2.6)
MCH RBC QN AUTO: 28.7 PG (ref 26.6–33)
MCHC RBC AUTO-ENTMCNC: 32.6 G/DL (ref 31.5–35.7)
MCV RBC AUTO: 88.1 FL (ref 79–97)
MONOCYTES # BLD AUTO: 0.86 10*3/MM3 (ref 0.1–0.9)
MONOCYTES NFR BLD AUTO: 8.3 % (ref 5–12)
NEUTROPHILS NFR BLD AUTO: 77.1 % (ref 42.7–76)
NEUTROPHILS NFR BLD AUTO: 8.01 10*3/MM3 (ref 1.7–7)
NRBC BLD AUTO-RTO: 0 /100 WBC (ref 0–0.2)
PHOSPHATE SERPL-MCNC: 2.7 MG/DL (ref 2.5–4.5)
PLATELET # BLD AUTO: 141 10*3/MM3 (ref 140–450)
PMV BLD AUTO: 10 FL (ref 6–12)
POTASSIUM SERPL-SCNC: 4.8 MMOL/L (ref 3.5–5.2)
PROT SERPL-MCNC: 6.6 G/DL (ref 6–8.5)
RBC # BLD AUTO: 4.28 10*6/MM3 (ref 4.14–5.8)
SODIUM SERPL-SCNC: 134 MMOL/L (ref 136–145)
WBC NRBC COR # BLD AUTO: 10.38 10*3/MM3 (ref 3.4–10.8)

## 2024-02-16 PROCEDURE — 63710000001 ONDANSETRON ODT 4 MG TABLET DISPERSIBLE: Performed by: FAMILY MEDICINE

## 2024-02-16 PROCEDURE — 99233 SBSQ HOSP IP/OBS HIGH 50: CPT | Performed by: INTERNAL MEDICINE

## 2024-02-16 PROCEDURE — 25810000003 SODIUM CHLORIDE 0.9 % SOLUTION: Performed by: FAMILY MEDICINE

## 2024-02-16 PROCEDURE — 0 GADOBENATE DIMEGLUMINE 529 MG/ML SOLUTION: Performed by: INTERNAL MEDICINE

## 2024-02-16 PROCEDURE — A9577 INJ MULTIHANCE: HCPCS | Performed by: INTERNAL MEDICINE

## 2024-02-16 PROCEDURE — 25010000002 HEPARIN (PORCINE) PER 1000 UNITS: Performed by: FAMILY MEDICINE

## 2024-02-16 PROCEDURE — 85025 COMPLETE CBC W/AUTO DIFF WBC: CPT | Performed by: INTERNAL MEDICINE

## 2024-02-16 PROCEDURE — 74183 MRI ABD W/O CNTR FLWD CNTR: CPT

## 2024-02-16 PROCEDURE — 25010000002 HYDROMORPHONE 1 MG/ML SOLUTION: Performed by: FAMILY MEDICINE

## 2024-02-16 PROCEDURE — 80053 COMPREHEN METABOLIC PANEL: CPT | Performed by: INTERNAL MEDICINE

## 2024-02-16 PROCEDURE — 84100 ASSAY OF PHOSPHORUS: CPT | Performed by: INTERNAL MEDICINE

## 2024-02-16 PROCEDURE — 83735 ASSAY OF MAGNESIUM: CPT | Performed by: INTERNAL MEDICINE

## 2024-02-16 RX ORDER — ENOXAPARIN SODIUM 100 MG/ML
40 INJECTION SUBCUTANEOUS EVERY 24 HOURS
Status: DISCONTINUED | OUTPATIENT
Start: 2024-02-17 | End: 2024-02-18 | Stop reason: HOSPADM

## 2024-02-16 RX ORDER — POLYETHYLENE GLYCOL 3350 17 G/17G
17 POWDER, FOR SOLUTION ORAL 2 TIMES DAILY
Status: DISCONTINUED | OUTPATIENT
Start: 2024-02-16 | End: 2024-02-18 | Stop reason: HOSPADM

## 2024-02-16 RX ORDER — OXYCODONE HYDROCHLORIDE 5 MG/1
5 TABLET ORAL EVERY 4 HOURS PRN
Status: DISCONTINUED | OUTPATIENT
Start: 2024-02-16 | End: 2024-02-18 | Stop reason: HOSPADM

## 2024-02-16 RX ORDER — OXYCODONE HYDROCHLORIDE 5 MG/1
10 TABLET ORAL EVERY 4 HOURS PRN
Status: DISCONTINUED | OUTPATIENT
Start: 2024-02-16 | End: 2024-02-18 | Stop reason: HOSPADM

## 2024-02-16 RX ADMIN — HYDROCODONE BITARTRATE AND ACETAMINOPHEN 1 TABLET: 10; 325 TABLET ORAL at 01:54

## 2024-02-16 RX ADMIN — HEPARIN SODIUM 5000 UNITS: 5000 INJECTION INTRAVENOUS; SUBCUTANEOUS at 14:32

## 2024-02-16 RX ADMIN — POLYETHYLENE GLYCOL 3350 17 G: 17 POWDER, FOR SOLUTION ORAL at 22:47

## 2024-02-16 RX ADMIN — HYDROMORPHONE HYDROCHLORIDE 0.5 MG: 1 INJECTION, SOLUTION INTRAMUSCULAR; INTRAVENOUS; SUBCUTANEOUS at 10:44

## 2024-02-16 RX ADMIN — GADOBENATE DIMEGLUMINE 15 ML: 529 INJECTION, SOLUTION INTRAVENOUS at 07:54

## 2024-02-16 RX ADMIN — Medication 10 ML: at 08:24

## 2024-02-16 RX ADMIN — HYDROMORPHONE HYDROCHLORIDE 0.5 MG: 1 INJECTION, SOLUTION INTRAMUSCULAR; INTRAVENOUS; SUBCUTANEOUS at 04:18

## 2024-02-16 RX ADMIN — OXYCODONE 5 MG: 5 TABLET ORAL at 12:17

## 2024-02-16 RX ADMIN — Medication 10 ML: at 22:48

## 2024-02-16 RX ADMIN — DOCUSATE SODIUM 50MG AND SENNOSIDES 8.6MG 2 TABLET: 8.6; 5 TABLET, FILM COATED ORAL at 08:24

## 2024-02-16 RX ADMIN — DOCUSATE SODIUM 50MG AND SENNOSIDES 8.6MG 2 TABLET: 8.6; 5 TABLET, FILM COATED ORAL at 22:48

## 2024-02-16 RX ADMIN — POLYETHYLENE GLYCOL 3350 17 G: 17 POWDER, FOR SOLUTION ORAL at 12:16

## 2024-02-16 RX ADMIN — OXYCODONE 10 MG: 5 TABLET ORAL at 14:31

## 2024-02-16 RX ADMIN — OXYCODONE 10 MG: 5 TABLET ORAL at 18:40

## 2024-02-16 RX ADMIN — OXYCODONE 10 MG: 5 TABLET ORAL at 22:48

## 2024-02-16 RX ADMIN — HYDROCODONE BITARTRATE AND ACETAMINOPHEN 1 TABLET: 10; 325 TABLET ORAL at 09:37

## 2024-02-16 RX ADMIN — SODIUM CHLORIDE 150 ML/HR: 9 INJECTION, SOLUTION INTRAVENOUS at 04:17

## 2024-02-16 RX ADMIN — HYDROMORPHONE HYDROCHLORIDE 0.5 MG: 1 INJECTION, SOLUTION INTRAMUSCULAR; INTRAVENOUS; SUBCUTANEOUS at 13:24

## 2024-02-16 RX ADMIN — ONDANSETRON 4 MG: 4 TABLET, ORALLY DISINTEGRATING ORAL at 14:32

## 2024-02-16 RX ADMIN — HYDROMORPHONE HYDROCHLORIDE 0.5 MG: 1 INJECTION, SOLUTION INTRAMUSCULAR; INTRAVENOUS; SUBCUTANEOUS at 08:24

## 2024-02-16 NOTE — PLAN OF CARE
Goal Outcome Evaluation:  Plan of Care Reviewed With: patient        Progress: no change  Outcome Evaluation: Pt c/o frequent pain/discomfort this shift, administered prn pain meds per MAR. On continuous IV fluids this shift. Pt will liekly have an abd MRI sometime in the early am. Pt was able to get some rest this shift, call light in reach. No new issues or new needs noted at this time.

## 2024-02-16 NOTE — TELEPHONE ENCOUNTER
Per Dr. Alonzo, needs follow-up for acute pancreatitis.     Patient is currently admitted. I will check back on Monday, 2.19.24    Postponing Patient Call until Monday, 2.19.24

## 2024-02-17 LAB
ALBUMIN SERPL-MCNC: 3.7 G/DL (ref 3.5–5.2)
ALBUMIN/GLOB SERPL: 1.2 G/DL
ALP SERPL-CCNC: 70 U/L (ref 39–117)
ALT SERPL W P-5'-P-CCNC: 11 U/L (ref 1–41)
ANION GAP SERPL CALCULATED.3IONS-SCNC: 11 MMOL/L (ref 5–15)
AST SERPL-CCNC: 14 U/L (ref 1–40)
BILIRUB SERPL-MCNC: 0.9 MG/DL (ref 0–1.2)
BUN SERPL-MCNC: 8 MG/DL (ref 6–20)
BUN/CREAT SERPL: 10.5 (ref 7–25)
CALCIUM SPEC-SCNC: 9.3 MG/DL (ref 8.6–10.5)
CHLORIDE SERPL-SCNC: 96 MMOL/L (ref 98–107)
CO2 SERPL-SCNC: 26 MMOL/L (ref 22–29)
CREAT SERPL-MCNC: 0.76 MG/DL (ref 0.76–1.27)
EGFRCR SERPLBLD CKD-EPI 2021: 110.2 ML/MIN/1.73
GLOBULIN UR ELPH-MCNC: 3.1 GM/DL
GLUCOSE SERPL-MCNC: 97 MG/DL (ref 65–99)
MAGNESIUM SERPL-MCNC: 2.1 MG/DL (ref 1.6–2.6)
PHOSPHATE SERPL-MCNC: 3.3 MG/DL (ref 2.5–4.5)
POTASSIUM SERPL-SCNC: 4 MMOL/L (ref 3.5–5.2)
PROT SERPL-MCNC: 6.8 G/DL (ref 6–8.5)
SODIUM SERPL-SCNC: 133 MMOL/L (ref 136–145)

## 2024-02-17 PROCEDURE — 99232 SBSQ HOSP IP/OBS MODERATE 35: CPT | Performed by: INTERNAL MEDICINE

## 2024-02-17 PROCEDURE — 25010000002 ENOXAPARIN PER 10 MG: Performed by: INTERNAL MEDICINE

## 2024-02-17 PROCEDURE — 83735 ASSAY OF MAGNESIUM: CPT | Performed by: INTERNAL MEDICINE

## 2024-02-17 PROCEDURE — 84100 ASSAY OF PHOSPHORUS: CPT | Performed by: INTERNAL MEDICINE

## 2024-02-17 PROCEDURE — 80053 COMPREHEN METABOLIC PANEL: CPT | Performed by: INTERNAL MEDICINE

## 2024-02-17 RX ORDER — ALUMINA, MAGNESIA, AND SIMETHICONE 2400; 2400; 240 MG/30ML; MG/30ML; MG/30ML
15 SUSPENSION ORAL ONCE
Status: COMPLETED | OUTPATIENT
Start: 2024-02-17 | End: 2024-02-17

## 2024-02-17 RX ORDER — SIMETHICONE 80 MG
80 TABLET,CHEWABLE ORAL
Status: DISCONTINUED | OUTPATIENT
Start: 2024-02-17 | End: 2024-02-18 | Stop reason: HOSPADM

## 2024-02-17 RX ORDER — CALCIUM CARBONATE 500 MG/1
1 TABLET, CHEWABLE ORAL ONCE
Status: COMPLETED | OUTPATIENT
Start: 2024-02-17 | End: 2024-02-17

## 2024-02-17 RX ORDER — PANTOPRAZOLE SODIUM 40 MG/1
40 TABLET, DELAYED RELEASE ORAL
Status: DISCONTINUED | OUTPATIENT
Start: 2024-02-17 | End: 2024-02-18 | Stop reason: HOSPADM

## 2024-02-17 RX ADMIN — DOCUSATE SODIUM 50MG AND SENNOSIDES 8.6MG 2 TABLET: 8.6; 5 TABLET, FILM COATED ORAL at 08:39

## 2024-02-17 RX ADMIN — SIMETHICONE 80 MG: 80 TABLET, CHEWABLE ORAL at 16:55

## 2024-02-17 RX ADMIN — PANTOPRAZOLE SODIUM 40 MG: 40 TABLET, DELAYED RELEASE ORAL at 09:47

## 2024-02-17 RX ADMIN — POLYETHYLENE GLYCOL 3350 17 G: 17 POWDER, FOR SOLUTION ORAL at 08:39

## 2024-02-17 RX ADMIN — Medication 10 ML: at 08:40

## 2024-02-17 RX ADMIN — SIMETHICONE 80 MG: 80 TABLET, CHEWABLE ORAL at 21:14

## 2024-02-17 RX ADMIN — BISACODYL 5 MG: 5 TABLET, COATED ORAL at 08:40

## 2024-02-17 RX ADMIN — ALUMINUM HYDROXIDE, MAGNESIUM HYDROXIDE, AND DIMETHICONE 15 ML: 400; 400; 40 SUSPENSION ORAL at 09:47

## 2024-02-17 RX ADMIN — Medication 10 ML: at 21:14

## 2024-02-17 RX ADMIN — CALCIUM CARBONATE 1 TABLET: 500 TABLET, CHEWABLE ORAL at 00:45

## 2024-02-17 RX ADMIN — OXYCODONE 10 MG: 5 TABLET ORAL at 21:20

## 2024-02-17 RX ADMIN — ENOXAPARIN SODIUM 40 MG: 100 INJECTION SUBCUTANEOUS at 06:32

## 2024-02-17 RX ADMIN — SIMETHICONE 80 MG: 80 TABLET, CHEWABLE ORAL at 11:06

## 2024-02-17 NOTE — PLAN OF CARE
Goal Outcome Evaluation:  Plan of Care Reviewed With: patient        Progress: declining  Outcome Evaluation: Patient had MRI this morning. Pain medication regimine adjusted to help patients tolerance. This RN noticed he was in more pain this shift, notified MD. Patient medication changed from norco to oxycodone. Patient states he seemed to find the most relief with the oxy 10mg. Dilauded used for breakthrough pain as needed. IV fluids were discontinued. Patient is eating somewhat but this RN educated that if he is hurting after eating to maybe monitor how much he is intaking. VSS

## 2024-02-17 NOTE — PROGRESS NOTES
[facility]   Hospitalist Progress Note    Date of admission: 2/13/2024  Patient Name: Adalberto Toribio  1975  Date: 2/17/2024      Subjective     Chief Complaint   Patient presents with    Abdominal Pain    Chest Pain       Interval Followup: There was some reflux and GI symptoms still today.  No bowel movement.  P.o. intake doing okay does not have increased pain with this but still concerned about symptoms    Objective     Vitals:   Temp:  [97.9 °F (36.6 °C)-99.5 °F (37.5 °C)] 98.6 °F (37 °C)  Heart Rate:  [72-78] 74  Resp:  [20-22] 20  BP: (130-158)/(67-74) 158/74    Physical Exam  Awake  CTAB no wheezes faint crackles in bases improving with repeat aspiration  RRR  Mild periumbilical abdominal pain, no guarding, positive bowel sounds    Result Review:  Vital signs, labs and recent relevant imaging reviewed.        [DISCONTINUED] senna-docusate sodium **AND** polyethylene glycol **AND** bisacodyl **AND** bisacodyl    ondansetron ODT **OR** ondansetron    oxyCODONE    oxyCODONE    sodium chloride    sodium chloride    sodium chloride    enoxaparin, 40 mg, Subcutaneous, Q24H  pantoprazole, 40 mg, Oral, Q AM  polyethylene glycol, 17 g, Oral, BID  senna-docusate sodium, 2 tablet, Oral, BID  simethicone, 80 mg, Oral, 4x Daily AC & at Bedtime  sodium chloride, 10 mL, Intravenous, Q12H        MRI Abdomen With & Without Contrast    Result Date: 2/16/2024    1. Acute interstitial pancreatitis. 2. No cholelithiasis or evidence of choledocholithiasis.  Normal common bile duct measuring 5 mm. 3. Mild splenomegaly. 4. Hepatic steatosis. 5. Small bilateral pleural effusions with mild bibasilar atelectasis.       COLLINS GARCIA MD       Electronically Signed and Approved By: COLLINS GARCIA MD on 2/16/2024 at 8:17             US Gallbladder    Result Date: 2/14/2024    1. Probable adenomyomatosis of the wall of the gallbladder.  No definite stones or sludge.  No sonographic evidence of acute cholecystitis. 2. Common  bile duct is normal in caliber.     CARMEN RASMUSSEN MD       Electronically Signed and Approved By: CARMEN RASMUSSEN MD on 2/14/2024 at 13:42             CT Abdomen Pelvis With Contrast    Result Date: 2/13/2024    1. Inflammatory changes centered around the pancreas compatible with acute pancreatitis.  No evidence of abscess or pseudocyst formation 2. No acute abnormality otherwise noted.     CARMEN RASMUSSEN MD       Electronically Signed and Approved By: CARMEN RASMUSSEN MD on 2/13/2024 at 15:16             XR Chest 1 View    Result Date: 2/13/2024    1. No acute cardiopulmonary disease.       Anish Mccabe M.D.       Electronically Signed and Approved By: Anish Mccabe M.D. on 2/13/2024 at 14:00              LFTs remain within normal limits today, hemoglobin 12.3 likely 0.38 creatinine 0.7    Assessment / Plan     Summary: 48M with hx of anxiety/depression, gerd who presented with 3d of worsening abdominal pain and found to have acute pancreatitis.  Unclear source.     Assessment/Plan (clinically significant if listed here)  Acute pancreatitis  Acute hypoxia question atelectasis versus fluid from resuscitation  Leukocytosis, suspect reactive secondary to above  Chronic nicotine vaping  GERD  Anxiety/depression    MRI abdomen with acute interstitial pancreatitis noted, atelectasis in the lung bases with small bilateral pleural effusions and hepatic steatosis noted, normal CBD and no dilation or status noted  Discussed with GI no acute additional intervention required at this time, follow-up as outpatient for continued monitoring  Continue to wean off of pain medications, try to use only oral medications today, monitor  Continue bowel regimen  Monitor off IV fluids did have small pleural effusion and some pulm edema from initial fluid resuscitation  Prn antiemetics  Resume ppi, prn maalox,  Schedule simethicone  Lipids: unremarkable, no alcohol use, LFTs wnl, no gallstones, Ultrasound gallbladder without stones  or dilation, adenomyomatosis incidentally noted  F/u IGG levels  Cont bowel regimen  Check a.m. CBC, BMP, magnesium, phosphorus, lfts,   Continue hospitalization at current level of care    Depending on p.o. intake and pain control hopefully can go home 1 to 2 days        DVT prophylaxis:  Medical DVT prophylaxis orders are present.        Code Status (Patient has no pulse and is not breathing): CPR (Attempt to Resuscitate)  Medical Interventions (Patient has pulse or is breathing): Full Support        CBC          2/14/2024    04:45 2/15/2024    09:03 2/16/2024    05:18   CBC   WBC 10.30  9.74  10.38    RBC 4.56  4.32  4.28    Hemoglobin 12.9  12.4  12.3    Hematocrit 40.4  38.3  37.7    MCV 88.6  88.7  88.1    MCH 28.3  28.7  28.7    MCHC 31.9  32.4  32.6    RDW 12.7  12.2  12.5    Platelets 160  137  141        CMP          2/15/2024    09:03 2/16/2024    05:18 2/17/2024    05:57   CMP   Glucose 98  93  97    BUN 7  7  8    Creatinine 0.83  0.70  0.76    EGFR 107.3  113.0  110.2    Sodium 135  134  133    Potassium 4.0  4.8  4.0    Chloride 101  102  96    Calcium 8.5  8.7  9.3    Total Protein 6.1  6.6  6.8    Albumin 3.4  3.5  3.7    Globulin 2.7  3.1  3.1    Total Bilirubin 1.0  0.9  0.9    Alkaline Phosphatase 54  57  70    AST (SGOT) 13  13  14    ALT (SGPT) 13  13  11    Albumin/Globulin Ratio 1.3  1.1  1.2    BUN/Creatinine Ratio 8.4  10.0  10.5    Anion Gap 10.9  8.6  11.0

## 2024-02-17 NOTE — PROGRESS NOTES
[facility]   Hospitalist Progress Note    Date of admission: 2/13/2024  Patient Name: Adalberto Toribio  1975  Date: 2/16/2024      Subjective     Chief Complaint   Patient presents with    Abdominal Pain    Chest Pain       Interval Followup: Still been using IV and p.o. Dilaudid/oxycodone pain medication.  Does feel like his p.o. intake and pain is improving overall.  Discussed weaning off of IV medications trying to see if he can stay on oral medications only so that he can discharge home.  Had MRI today discussed results with patient.    No shortness of air today, is off of oxygen  Objective     Vitals:   Temp:  [97.9 °F (36.6 °C)-99 °F (37.2 °C)] 97.9 °F (36.6 °C)  Heart Rate:  [67-83] 83  Resp:  [20] 20  BP: (131-154)/(56-71) 148/70    Physical Exam  Awake  CTAB no wheezes faint crackles in bases improving with repeat aspiration  RRR  Decreasing periumbilical abdominal pain no guarding positive bowel sounds    Result Review:  Vital signs, labs and recent relevant imaging reviewed.        [DISCONTINUED] senna-docusate sodium **AND** polyethylene glycol **AND** bisacodyl **AND** bisacodyl    HYDROmorphone    ondansetron ODT **OR** ondansetron    oxyCODONE    oxyCODONE    sodium chloride    sodium chloride    sodium chloride    heparin (porcine), 5,000 Units, Subcutaneous, Q8H  polyethylene glycol, 17 g, Oral, BID  senna-docusate sodium, 2 tablet, Oral, BID  sodium chloride, 10 mL, Intravenous, Q12H        MRI Abdomen With & Without Contrast    Result Date: 2/16/2024    1. Acute interstitial pancreatitis. 2. No cholelithiasis or evidence of choledocholithiasis.  Normal common bile duct measuring 5 mm. 3. Mild splenomegaly. 4. Hepatic steatosis. 5. Small bilateral pleural effusions with mild bibasilar atelectasis.       COLLINS GARCIA MD       Electronically Signed and Approved By: COLLINS GARCIA MD on 2/16/2024 at 8:17             US Gallbladder    Result Date: 2/14/2024    1. Probable adenomyomatosis of  the wall of the gallbladder.  No definite stones or sludge.  No sonographic evidence of acute cholecystitis. 2. Common bile duct is normal in caliber.     CARMEN RASMUSSEN MD       Electronically Signed and Approved By: CARMEN RASMUSSEN MD on 2/14/2024 at 13:42             CT Abdomen Pelvis With Contrast    Result Date: 2/13/2024    1. Inflammatory changes centered around the pancreas compatible with acute pancreatitis.  No evidence of abscess or pseudocyst formation 2. No acute abnormality otherwise noted.     CARMEN RASMUSSEN MD       Electronically Signed and Approved By: CARMEN RASMUSSEN MD on 2/13/2024 at 15:16             XR Chest 1 View    Result Date: 2/13/2024    1. No acute cardiopulmonary disease.       Anish Mccabe M.D.       Electronically Signed and Approved By: Anish Mccabe M.D. on 2/13/2024 at 14:00              LFTs remain within normal limits today, hemoglobin 12.3 likely 0.38 creatinine 0.7    Assessment / Plan     Summary: 48M with hx of anxiety/depression, gerd who presented with 3d of worsening abdominal pain and found to have acute pancreatitis.  Unclear source.     Assessment/Plan (clinically significant if listed here)  Acute pancreatitis  Acute hypoxia question atelectasis versus fluid from resuscitation  Leukocytosis, suspect reactive secondary to above  Chronic nicotine vaping  GERD  Anxiety/depression    MRI abdomen today with acute interstitial pancreatitis noted, atelectasis in the lung bases with small bilateral pleural effusions and hepatic steatosis noted, normal CBD and no dilation or status noted  Discussed with GI no acute additional intervention required at this time, follow-up as outpatient for continued monitoring  Continue to wean off of pain medications, still required frequent IV Dilaudid but decreased compared to yesterday, will try to discontinue at this time and use oral medication only  DC IV fluids given small pleural effusion, continue respiratory hygiene and  treatment of atelectasis, was able to wean off oxygen with improved activity, monitor  Lipids: unremarkable, no alcohol use, LFTs wnl, no gallstones, Ultrasound gallbladder without stones or dilation, adenomyomatosis incidentally noted  F/u IGG levels  Cont bowel regimen  Check a.m. CBC, BMP, magnesium, phosphorus, lfts,   Continue hospitalization at current level of care    Depending on p.o. intake and pain control hopefully can go home 1 to 2 days        DVT prophylaxis:  Medical DVT prophylaxis orders are present.        Code Status (Patient has no pulse and is not breathing): CPR (Attempt to Resuscitate)  Medical Interventions (Patient has pulse or is breathing): Full Support        CBC          2/14/2024    04:45 2/15/2024    09:03 2/16/2024    05:18   CBC   WBC 10.30  9.74  10.38    RBC 4.56  4.32  4.28    Hemoglobin 12.9  12.4  12.3    Hematocrit 40.4  38.3  37.7    MCV 88.6  88.7  88.1    MCH 28.3  28.7  28.7    MCHC 31.9  32.4  32.6    RDW 12.7  12.2  12.5    Platelets 160  137  141        CMP          2/14/2024    04:45 2/15/2024    09:03 2/16/2024    05:18   CMP   Glucose 90  98  93    BUN 8  7  7    Creatinine 0.86  0.83  0.70    EGFR 106.1  107.3  113.0    Sodium 134  135  134    Potassium 4.2  4.0  4.8    Chloride 103  101  102    Calcium 8.2  8.5  8.7    Total Protein 6.0  6.1  6.6    Albumin 3.6  3.4  3.5    Globulin 2.4  2.7  3.1    Total Bilirubin 1.2  1.0  0.9    Alkaline Phosphatase 55  54  57    AST (SGOT) 19  13  13    ALT (SGPT) 18  13  13    Albumin/Globulin Ratio 1.5  1.3  1.1    BUN/Creatinine Ratio 9.3  8.4  10.0    Anion Gap 9.3  10.9  8.6

## 2024-02-18 VITALS
BODY MASS INDEX: 24.23 KG/M2 | SYSTOLIC BLOOD PRESSURE: 127 MMHG | DIASTOLIC BLOOD PRESSURE: 72 MMHG | HEART RATE: 95 BPM | OXYGEN SATURATION: 96 % | RESPIRATION RATE: 18 BRPM | WEIGHT: 150.79 LBS | TEMPERATURE: 98.1 F | HEIGHT: 66 IN

## 2024-02-18 LAB
ALBUMIN SERPL-MCNC: 4 G/DL (ref 3.5–5.2)
ALBUMIN/GLOB SERPL: 1 G/DL
ALP SERPL-CCNC: 92 U/L (ref 39–117)
ALT SERPL W P-5'-P-CCNC: 20 U/L (ref 1–41)
ANION GAP SERPL CALCULATED.3IONS-SCNC: 13.6 MMOL/L (ref 5–15)
AST SERPL-CCNC: 22 U/L (ref 1–40)
BILIRUB SERPL-MCNC: 0.7 MG/DL (ref 0–1.2)
BUN SERPL-MCNC: 10 MG/DL (ref 6–20)
BUN/CREAT SERPL: 11.1 (ref 7–25)
CALCIUM SPEC-SCNC: 10 MG/DL (ref 8.6–10.5)
CHLORIDE SERPL-SCNC: 93 MMOL/L (ref 98–107)
CO2 SERPL-SCNC: 24.4 MMOL/L (ref 22–29)
CREAT SERPL-MCNC: 0.9 MG/DL (ref 0.76–1.27)
EGFRCR SERPLBLD CKD-EPI 2021: 104.7 ML/MIN/1.73
GLOBULIN UR ELPH-MCNC: 4.1 GM/DL
GLUCOSE SERPL-MCNC: 93 MG/DL (ref 65–99)
MAGNESIUM SERPL-MCNC: 2.4 MG/DL (ref 1.6–2.6)
PHOSPHATE SERPL-MCNC: 4.1 MG/DL (ref 2.5–4.5)
POTASSIUM SERPL-SCNC: 3.4 MMOL/L (ref 3.5–5.2)
PROT SERPL-MCNC: 8.1 G/DL (ref 6–8.5)
SODIUM SERPL-SCNC: 131 MMOL/L (ref 136–145)
WHOLE BLOOD HOLD SPECIMEN: NORMAL

## 2024-02-18 PROCEDURE — 80053 COMPREHEN METABOLIC PANEL: CPT | Performed by: INTERNAL MEDICINE

## 2024-02-18 PROCEDURE — 83735 ASSAY OF MAGNESIUM: CPT | Performed by: INTERNAL MEDICINE

## 2024-02-18 PROCEDURE — 25010000002 ENOXAPARIN PER 10 MG: Performed by: INTERNAL MEDICINE

## 2024-02-18 PROCEDURE — 99239 HOSP IP/OBS DSCHRG MGMT >30: CPT | Performed by: INTERNAL MEDICINE

## 2024-02-18 PROCEDURE — 84100 ASSAY OF PHOSPHORUS: CPT | Performed by: INTERNAL MEDICINE

## 2024-02-18 RX ORDER — OXYCODONE HYDROCHLORIDE 5 MG/1
5 TABLET ORAL EVERY 4 HOURS PRN
Qty: 18 TABLET | Refills: 0 | Status: SHIPPED | OUTPATIENT
Start: 2024-02-18

## 2024-02-18 RX ORDER — ONDANSETRON 4 MG/1
4 TABLET, ORALLY DISINTEGRATING ORAL EVERY 8 HOURS PRN
Qty: 16 TABLET | Refills: 0 | Status: SHIPPED | OUTPATIENT
Start: 2024-02-18

## 2024-02-18 RX ORDER — PANTOPRAZOLE SODIUM 40 MG/1
40 TABLET, DELAYED RELEASE ORAL
Qty: 30 TABLET | Refills: 0 | Status: SHIPPED | OUTPATIENT
Start: 2024-02-19 | End: 2024-03-20

## 2024-02-18 RX ORDER — POLYETHYLENE GLYCOL 3350 17 G/17G
17 POWDER, FOR SOLUTION ORAL 2 TIMES DAILY PRN
Qty: 20 EACH | Refills: 0 | Status: SHIPPED | OUTPATIENT
Start: 2024-02-18

## 2024-02-18 RX ADMIN — ENOXAPARIN SODIUM 40 MG: 100 INJECTION SUBCUTANEOUS at 06:35

## 2024-02-18 RX ADMIN — POLYETHYLENE GLYCOL 3350 17 G: 17 POWDER, FOR SOLUTION ORAL at 07:50

## 2024-02-18 RX ADMIN — OXYCODONE 10 MG: 5 TABLET ORAL at 01:18

## 2024-02-18 RX ADMIN — SIMETHICONE 80 MG: 80 TABLET, CHEWABLE ORAL at 11:37

## 2024-02-18 RX ADMIN — Medication 10 ML: at 09:19

## 2024-02-18 RX ADMIN — SIMETHICONE 80 MG: 80 TABLET, CHEWABLE ORAL at 06:36

## 2024-02-18 RX ADMIN — OXYCODONE 5 MG: 5 TABLET ORAL at 07:49

## 2024-02-18 RX ADMIN — PANTOPRAZOLE SODIUM 40 MG: 40 TABLET, DELAYED RELEASE ORAL at 06:36

## 2024-02-18 NOTE — PLAN OF CARE
Goal Outcome Evaluation:              Outcome Evaluation: aox4. c/o abdominal pressure, multiple BMs noted this shift. patient states pain more tolerable as shift progressed. tolerating low irritant diet. refusing bed alarm, up ad simone. educated on fall risk measures.

## 2024-02-18 NOTE — DISCHARGE SUMMARY
[facility]         HOSPITALIST  DISCHARGE SUMMARY    Patient Name: Adalberto Toribio    : 1975    MRN: [id number]    Date of Admission: 2024  Date of Discharge:  24  Primary Care Physician: Jamil Katz APRN    Consults       Date and Time Order Name Status Description    2/15/2024  1:27 PM Inpatient Gastroenterology Consult      2024  4:50 PM Hospitalist (on-call MD unless specified)              Final Diagnosis:  Acute pancreatitis, idiopathic   Acute hypoxia question atelectasis versus fluid from resuscitation  Leukocytosis, suspect reactive secondary to above  Chronic nicotine vaping  GERD  Anxiety/depression    Hospital Course     Hospital Course:  48M with hx of anxiety/depression, gerd who presented with 3d of worsening abdominal pain and found to have acute pancreatitis. Unclear source.  GI evaluated, MRI and initial imaging without gallstones or ductal dilation, IGG levels pending, pt to follow up with GI outpatient for further monitoring.      Patient had hypoxia associated with atelectasis and mild pulmonary edema from initial fluid resuscitation.  Improving and stable on room air by discharge    Tolerating po intake and on oral pain medication by time of discharge. Bowel regimen/antimetics prescribed.      Patient discharged in stable condition with close PCP and gi follow up.     Return precautions and follow up discussed and patient voiced agreement and understanding of treatment plan.       CODE STATUS:  Code Status and Medical Interventions:   Ordered at: 24     Code Status (Patient has no pulse and is not breathing):    CPR (Attempt to Resuscitate)     Medical Interventions (Patient has pulse or is breathing):    Full Support           Day of Discharge     Vital Signs:  Temp:  [98.1 °F (36.7 °C)-99 °F (37.2 °C)] 98.1 °F (36.7 °C)  Heart Rate:  [70-95] 95  Resp:  [18-20] 18  BP: (125-149)/(56-78) 127/72    Physical Exam  Gen: awake, resting  in bed, conversant   Resp: breathing comfortably on room air  GI: resolving/minimal periumbilical ttp, no guarding +BS  Psych: appropriate mood and affect, aox3  Skin: warm, dry      Discharge Details        Discharge Medications        New Medications        Instructions Start Date   ondansetron ODT 4 MG disintegrating tablet  Commonly known as: ZOFRAN-ODT   4 mg, Translingual, Every 8 Hours PRN      oxyCODONE 5 MG immediate release tablet  Commonly known as: ROXICODONE   5 mg, Oral, Every 4 Hours PRN      pantoprazole 40 MG EC tablet  Commonly known as: PROTONIX   40 mg, Oral, Every Early Morning   Start Date: February 19, 2024     polyethylene glycol 17 g packet  Commonly known as: MIRALAX   17 g, Oral, 2 Times Daily PRN                 Discharge Disposition:  Home or Self Care    Diet: patient counseled on dietary changes made during hospital and plans to  advance as tolerated     Discharge Activity: advance as tolerated      Additional Instructions for the Follow-ups that You Need to Schedule       Discharge Follow-up with PCP   As directed       Currently Documented PCP:    Jamil Katz APRN    PCP Phone Number:    None     Follow Up Details: 3-5 days hospital f/u pancreatitis        Discharge Follow-up with Specified Provider: 1 month gi f/u dr rueda for pancreatitis   As directed      To: 1 month gi f/u dr rueda for pancreatitis                Pertinent  and/or Most Recent Results       LAB RESULTS:      Lab 02/16/24  0518 02/15/24  0903 02/14/24  0445 02/13/24  1354   WBC 10.38 9.74 10.30 13.19*   HEMOGLOBIN 12.3* 12.4* 12.9* 15.8   HEMATOCRIT 37.7 38.3 40.4 47.6   PLATELETS 141 137* 160 212   NEUTROS ABS 8.01* 7.86* 7.97* 11.65*   IMMATURE GRANS (ABS) 0.04 0.05 0.04 0.04   LYMPHS ABS 1.35 0.98 1.68 0.91   MONOS ABS 0.86 0.76 0.55 0.54   EOS ABS 0.08 0.05 0.02 0.01   MCV 88.1 88.7 88.6 87.0         Lab 02/18/24  0441 02/17/24  0557 02/16/24  0518 02/15/24  1411 02/15/24  0903 02/14/24  0445   SODIUM  131* 133* 134*  --  135* 134*   POTASSIUM 3.4* 4.0 4.8  --  4.0 4.2   CHLORIDE 93* 96* 102  --  101 103   CO2 24.4 26.0 23.4  --  23.1 21.7*   ANION GAP 13.6 11.0 8.6  --  10.9 9.3   BUN 10 8 7  --  7 8   CREATININE 0.90 0.76 0.70*  --  0.83 0.86   EGFR 104.7 110.2 113.0  --  107.3 106.1   GLUCOSE 93 97 93  --  98 90   CALCIUM 10.0 9.3 8.7  --  8.5* 8.2*   IONIZED CALCIUM  --   --   --  1.16  --   --    MAGNESIUM 2.4 2.1 2.1  --  2.2 2.2   PHOSPHORUS 4.1 3.3 2.7  --  2.1*  --          Lab 02/18/24  0441 02/17/24  0557 02/16/24  0518 02/15/24  0903 02/14/24  0445 02/13/24  1354   TOTAL PROTEIN 8.1 6.8 6.6 6.1 6.0 8.1   ALBUMIN 4.0 3.7 3.5 3.4* 3.6 4.8   GLOBULIN 4.1 3.1 3.1 2.7 2.4 3.3   ALT (SGPT) 20 11 13 13 18 31   AST (SGOT) 22 14 13 13 19 33   BILIRUBIN 0.7 0.9 0.9 1.0 1.2 1.1   ALK PHOS 92 70 57 54 55 76   LIPASE  --   --   --   --   --  1,621*         Lab 02/13/24  1354   PROBNP <36.0   HSTROP T <6         Lab 02/14/24  0445   CHOLESTEROL 115   LDL CHOL 64   HDL CHOL 36*   TRIGLYCERIDES 75             Brief Urine Lab Results       None          Microbiology Results (last 10 days)       ** No results found for the last 240 hours. **            RADIOLOGY:    MRI Abdomen With & Without Contrast    Result Date: 2/16/2024  Impression:   1. Acute interstitial pancreatitis. 2. No cholelithiasis or evidence of choledocholithiasis.  Normal common bile duct measuring 5 mm. 3. Mild splenomegaly. 4. Hepatic steatosis. 5. Small bilateral pleural effusions with mild bibasilar atelectasis.       COLLINS GARCIA MD       Electronically Signed and Approved By: COLLINS GARCIA MD on 2/16/2024 at 8:17             US Gallbladder    Result Date: 2/14/2024  Impression:   1. Probable adenomyomatosis of the wall of the gallbladder.  No definite stones or sludge.  No sonographic evidence of acute cholecystitis. 2. Common bile duct is normal in caliber.     CARMEN RASMUSSEN MD       Electronically Signed and Approved By: CARMEN RASMUSSEN MD  on 2/14/2024 at 13:42             CT Abdomen Pelvis With Contrast    Result Date: 2/13/2024  Impression:   1. Inflammatory changes centered around the pancreas compatible with acute pancreatitis.  No evidence of abscess or pseudocyst formation 2. No acute abnormality otherwise noted.     CARMEN RASMUSSEN MD       Electronically Signed and Approved By: CARMEN RASMUSSEN MD on 2/13/2024 at 15:16             XR Chest 1 View    Result Date: 2/13/2024  Impression:   1. No acute cardiopulmonary disease.       Anish Mccabe M.D.       Electronically Signed and Approved By: Anish Mccabe M.D. on 2/13/2024 at 14:00                          Labs Pending at Discharge:        Time spent on Discharge including face to face service:  32 minutes

## 2024-02-18 NOTE — PLAN OF CARE
Before Your Surgery      Call your surgeon if there is any change in your health. This includes signs of a cold or flu (such as a sore throat, runny nose, cough, rash or fever).    Do not smoke, drink alcohol or take over the counter medicine (unless your surgeon or primary care doctor tells you to) for the 24 hours before and after surgery.    If you take prescribed drugs: Follow your doctor s orders about which medicines to take and which to stop until after surgery.    Eating and drinking prior to surgery: follow the instructions from your surgeon    Take a shower or bath the night before surgery. Use the soap your surgeon gave you to gently clean your skin. If you do not have soap from your surgeon, use your regular soap. Do not shave or scrub the surgery site.  Wear clean pajamas and have clean sheets on your bed.    Goal Outcome Evaluation:              Outcome Evaluation: aox4. patient states abdominal pain has improved from yesterday. medicated for c/o pain per mar. up ad simone. dsicharge education provided. discharge home via private vehicle.

## 2024-02-19 ENCOUNTER — READMISSION MANAGEMENT (OUTPATIENT)
Dept: CALL CENTER | Facility: HOSPITAL | Age: 49
End: 2024-02-19
Payer: OTHER GOVERNMENT

## 2024-02-19 NOTE — OUTREACH NOTE
Prep Survey      Flowsheet Row Responses   [facility] facility patient discharged from? [facility]   Is LACE score < 7 ? No   Eligibility Readm Mgmt   Discharge diagnosis Acute pancreatitis   Does the patient have one of the following disease processes/diagnoses(primary or secondary)? Other   Does the patient have Home health ordered? No   Is there a DME ordered? No   Medication alerts for this patient see avs   Prep survey completed? Yes            Citlali CASTILLO - Registered Nurse

## 2024-02-21 ENCOUNTER — READMISSION MANAGEMENT (OUTPATIENT)
Dept: CALL CENTER | Facility: HOSPITAL | Age: 49
End: 2024-02-21
Payer: OTHER GOVERNMENT

## 2024-02-21 NOTE — OUTREACH NOTE
Medical Week 1 Survey      Flowsheet Row Responses   [facility] patient discharged from? [facility]   Does the patient have one of the following disease processes/diagnoses(primary or secondary)? Other   Week 1 attempt successful? Yes   Call start time 1030   Call end time 1035   Meds reviewed with patient/caregiver? Yes   Is the patient having any side effects they believe may be caused by any medication additions or changes? No   Does the patient have all medications ordered at discharge? Yes   Is the patient taking all medications as directed (includes completed medication regime)? Yes   Comments regarding appointments Patient has scheduled GI appt.   Does the patient have a primary care provider?  Yes   Does the patient have an appointment with their PCP within 7 days of discharge? No   What is preventing the patient from scheduling follow up appointments within 7 days of discharge? Haven't had time   Nursing Interventions Educated patient on importance of making appointment, Advised patient to make appointment   Has the patient kept scheduled appointments due by today? N/A   Has home health visited the patient within 72 hours of discharge? N/A   Psychosocial issues? No   Did the patient receive a copy of their discharge instructions? Yes   Nursing interventions Reviewed instructions with patient   What is the patient's perception of their health status since discharge? Improving   Is the patient/caregiver able to teach back signs and symptoms related to disease process for when to call PCP? Yes   Is the patient/caregiver able to teach back signs and symptoms related to disease process for when to call 911? Yes   Is the patient/caregiver able to teach back the hierarchy of who to call/visit for symptoms/problems? PCP, Specialist, Home health nurse, Urgent Care, ED, 911 Yes   If the patient is a current smoker, are they able to teach back resources for cessation? Not a smoker   Week 1 call completed? Yes    Would this patient benefit from a Referral to [facility] Social Work? No   Is the patient interested in additional calls from an ambulatory ? No   Call end time 5025            Louise BERNAL - Registered Nurse

## 2024-02-28 ENCOUNTER — READMISSION MANAGEMENT (OUTPATIENT)
Dept: CALL CENTER | Facility: HOSPITAL | Age: 49
End: 2024-02-28
Payer: OTHER GOVERNMENT

## 2024-02-28 NOTE — OUTREACH NOTE
Medical Week 2 Survey      Flowsheet Row Responses   [facility] patient discharged from? [facility]   Does the patient have one of the following disease processes/diagnoses(primary or secondary)? Other   Week 2 attempt successful? Yes   Call start time 0904   Discharge diagnosis Acute pancreatitis   Call end time 0907   Person spoke with today (if not patient) and relationship Patient   Meds reviewed with patient/caregiver? Yes   Does the patient have all medications ordered at discharge? Yes   Is the patient taking all medications as directed (includes completed medication regime)? Yes   Does the patient have a primary care provider?  No   PCP Nursing Intervention --  [Patient declined needing any help to get PCP. He reports that his issue is just that provider left the office that he was going to and just has to make new patient appt to see different provider.]   Has the patient kept scheduled appointments due by today? N/A   Comments Patient reports that he has Gastro DR appt in April   Has home health visited the patient within 72 hours of discharge? N/A   Psychosocial issues? No   Did the patient receive a copy of their discharge instructions? Yes   Nursing interventions Reviewed instructions with patient   What is the patient's perception of their health status since discharge? Improving   Is the patient/caregiver able to teach back the hierarchy of who to call/visit for symptoms/problems? PCP, Specialist, Home health nurse, Urgent Care, ED, 911 Yes   Week 2 Call Completed? Yes   Graduated Yes   Is the patient interested in additional calls from an ambulatory ? No   Would this patient benefit from a Referral to Amb Social Work? No   Graduated/Revoked comments Patient reports doing well. He states that he is tolerating his food ok without increased pain.   Call end time 0907            KRISTINA ZUÑIGA - Registered Nurse

## 2024-03-10 LAB
QT INTERVAL: 343 MS
QTC INTERVAL: 441 MS

## 2024-04-17 ENCOUNTER — OFFICE VISIT (OUTPATIENT)
Dept: GASTROENTEROLOGY | Facility: CLINIC | Age: 49
End: 2024-04-17
Payer: OTHER GOVERNMENT

## 2024-04-17 VITALS
HEART RATE: 66 BPM | BODY MASS INDEX: 23.75 KG/M2 | DIASTOLIC BLOOD PRESSURE: 64 MMHG | SYSTOLIC BLOOD PRESSURE: 119 MMHG | WEIGHT: 147.8 LBS | HEIGHT: 66 IN

## 2024-04-17 DIAGNOSIS — Z87.19 HISTORY OF ACUTE PANCREATITIS: Primary | ICD-10-CM

## 2024-04-17 NOTE — PROGRESS NOTES
"Chief Complaint  acute pancreatitis (Patient feels \"his body has reset itself\". Explained no issues at this time, here for an ED f/u. )    Subjective          History of Present Illness  Adalberto Toribio presents to [facility] GASTROENTEROLOGY.  Patient is doing well.  Had an episode of acute pancreatitis recently in the hospital.  CT, ultrasound, MRI MRCP are negative except inflammation of the pancreas.  Patient's triglyceride, IgG subclasses and ionized calcium are also normal.  Patient does not drink alcohol.  Doing well.    Objective   Vital Signs:   /64 (BP Location: Left arm, Patient Position: Sitting, Cuff Size: Adult)   Pulse 66   Ht 167.6 cm (65.98\")   Wt 67 kg (147 lb 12.8 oz)   BMI 23.87 kg/m²     Physical Exam  Constitutional:       General: He is awake. He is not in acute distress.     Appearance: Normal appearance. He is well-developed and well-groomed.   HENT:      Head: Normocephalic and atraumatic.      Mouth/Throat:      Mouth: Mucous membranes are moist.      Comments: Low dental hygiene is good  Eyes:      General: Lids are normal.      Conjunctiva/sclera: Conjunctivae normal.      Pupils: Pupils are equal, round, and reactive to light.   Neck:      Thyroid: No thyroid mass.      Trachea: Trachea normal.   Cardiovascular:      Rate and Rhythm: Normal rate and regular rhythm.      Heart sounds: Normal heart sounds.   Pulmonary:      Effort: Pulmonary effort is normal.      Breath sounds: Normal breath sounds and air entry.   Abdominal:      General: Abdomen is flat. Bowel sounds are normal. There is no distension.      Palpations: Abdomen is soft. There is no mass.      Tenderness: There is no abdominal tenderness. There is no guarding.   Musculoskeletal:      Cervical back: Neck supple.      Right lower leg: No edema.      Left lower leg: No edema.   Skin:     General: Skin is warm and moist.      Coloration: Skin is not cyanotic, jaundiced or pale.      Findings: " No rash.      Nails: There is no clubbing.   Neurological:      Mental Status: He is alert and oriented to person, place, and time.   Psychiatric:         Attention and Perception: Attention normal.         Mood and Affect: Mood and affect normal.         Speech: Speech normal.        Result Review :       Assessment and Plan    Diagnoses and all orders for this visit:    1. History of acute pancreatitis (Primary)    2. Encounter for screening for malignant neoplasm of colon    PLAN:  Recommend EUS   2.  Patient also needs screening [facility]  Follow Up   No follow-ups on file.  Patient was given instructions and counseling regarding his condition or for health maintenance advice. Please see specific information pulled into the AVS if appropriate.

## 2024-06-10 ENCOUNTER — APPOINTMENT (OUTPATIENT)
Dept: GENERAL RADIOLOGY | Facility: HOSPITAL | Age: 49
End: 2024-06-10
Payer: OTHER GOVERNMENT

## 2024-06-10 ENCOUNTER — HOSPITAL ENCOUNTER (EMERGENCY)
Facility: HOSPITAL | Age: 49
Discharge: HOME | End: 2024-06-10
Attending: EMERGENCY MEDICINE | Admitting: EMERGENCY MEDICINE
Payer: OTHER GOVERNMENT

## 2024-06-10 VITALS
DIASTOLIC BLOOD PRESSURE: 81 MMHG | OXYGEN SATURATION: 98 % | BODY MASS INDEX: 24.11 KG/M2 | RESPIRATION RATE: 18 BRPM | SYSTOLIC BLOOD PRESSURE: 113 MMHG | TEMPERATURE: 98.7 F | HEIGHT: 66 IN | HEART RATE: 54 BPM | WEIGHT: 150 LBS

## 2024-06-10 DIAGNOSIS — R00.2 PALPITATIONS: Primary | ICD-10-CM

## 2024-06-10 DIAGNOSIS — R07.89 CHEST DISCOMFORT: ICD-10-CM

## 2024-06-10 LAB
ALBUMIN SERPL-MCNC: 4.6 G/DL (ref 3.5–5.2)
ALBUMIN/GLOB SERPL: 1.9 G/DL
ALP SERPL-CCNC: 69 U/L (ref 39–117)
ALT SERPL W P-5'-P-CCNC: 20 U/L (ref 1–41)
ANION GAP SERPL CALCULATED.3IONS-SCNC: 12.4 MMOL/L (ref 5–15)
AST SERPL-CCNC: 16 U/L (ref 1–40)
BASOPHILS # BLD AUTO: 0.08 10*3/MM3 (ref 0–0.2)
BASOPHILS NFR BLD AUTO: 1 % (ref 0–1.5)
BILIRUB SERPL-MCNC: 0.6 MG/DL (ref 0–1.2)
BUN SERPL-MCNC: 10 MG/DL (ref 6–20)
BUN/CREAT SERPL: 9.2 (ref 7–25)
CALCIUM SPEC-SCNC: 9.1 MG/DL (ref 8.6–10.5)
CHLORIDE SERPL-SCNC: 103 MMOL/L (ref 98–107)
CO2 SERPL-SCNC: 25.6 MMOL/L (ref 22–29)
CREAT SERPL-MCNC: 1.09 MG/DL (ref 0.76–1.27)
D DIMER PPP FEU-MCNC: 0.43 MCGFEU/ML (ref 0–0.5)
DEPRECATED RDW RBC AUTO: 40.4 FL (ref 37–54)
EGFRCR SERPLBLD CKD-EPI 2021: 83.2 ML/MIN/1.73
EOSINOPHIL # BLD AUTO: 0.24 10*3/MM3 (ref 0–0.4)
EOSINOPHIL NFR BLD AUTO: 3.1 % (ref 0.3–6.2)
ERYTHROCYTE [DISTWIDTH] IN BLOOD BY AUTOMATED COUNT: 13.2 % (ref 12.3–15.4)
GEN 5 2HR TROPONIN T REFLEX: 6 NG/L
GLOBULIN UR ELPH-MCNC: 2.4 GM/DL
GLUCOSE SERPL-MCNC: 100 MG/DL (ref 65–99)
HCT VFR BLD AUTO: 42.6 % (ref 37.5–51)
HGB BLD-MCNC: 14.3 G/DL (ref 13–17.7)
HOLD SPECIMEN: NORMAL
HOLD SPECIMEN: NORMAL
IMM GRANULOCYTES # BLD AUTO: 0.02 10*3/MM3 (ref 0–0.05)
IMM GRANULOCYTES NFR BLD AUTO: 0.3 % (ref 0–0.5)
LIPASE SERPL-CCNC: 37 U/L (ref 13–60)
LYMPHOCYTES # BLD AUTO: 2.88 10*3/MM3 (ref 0.7–3.1)
LYMPHOCYTES NFR BLD AUTO: 36.7 % (ref 19.6–45.3)
MAGNESIUM SERPL-MCNC: 2 MG/DL (ref 1.6–2.6)
MCH RBC QN AUTO: 28.8 PG (ref 26.6–33)
MCHC RBC AUTO-ENTMCNC: 33.6 G/DL (ref 31.5–35.7)
MCV RBC AUTO: 85.9 FL (ref 79–97)
MONOCYTES # BLD AUTO: 0.43 10*3/MM3 (ref 0.1–0.9)
MONOCYTES NFR BLD AUTO: 5.5 % (ref 5–12)
NEUTROPHILS NFR BLD AUTO: 4.2 10*3/MM3 (ref 1.7–7)
NEUTROPHILS NFR BLD AUTO: 53.4 % (ref 42.7–76)
NRBC BLD AUTO-RTO: 0 /100 WBC (ref 0–0.2)
NT-PROBNP SERPL-MCNC: <36 PG/ML (ref 0–450)
PLATELET # BLD AUTO: 187 10*3/MM3 (ref 140–450)
PMV BLD AUTO: 9.8 FL (ref 6–12)
POTASSIUM SERPL-SCNC: 3.9 MMOL/L (ref 3.5–5.2)
PROT SERPL-MCNC: 7 G/DL (ref 6–8.5)
RBC # BLD AUTO: 4.96 10*6/MM3 (ref 4.14–5.8)
SODIUM SERPL-SCNC: 141 MMOL/L (ref 136–145)
T4 FREE SERPL-MCNC: 1.11 NG/DL (ref 0.92–1.68)
TROPONIN T DELTA: -2 NG/L
TROPONIN T SERPL HS-MCNC: 8 NG/L
TSH SERPL DL<=0.05 MIU/L-ACNC: 2.51 UIU/ML (ref 0.27–4.2)
WBC NRBC COR # BLD AUTO: 7.85 10*3/MM3 (ref 3.4–10.8)
WHOLE BLOOD HOLD COAG: NORMAL
WHOLE BLOOD HOLD SPECIMEN: NORMAL

## 2024-06-10 PROCEDURE — 84443 ASSAY THYROID STIM HORMONE: CPT | Performed by: EMERGENCY MEDICINE

## 2024-06-10 PROCEDURE — 83690 ASSAY OF LIPASE: CPT | Performed by: EMERGENCY MEDICINE

## 2024-06-10 PROCEDURE — 84484 ASSAY OF TROPONIN QUANT: CPT | Performed by: EMERGENCY MEDICINE

## 2024-06-10 PROCEDURE — 85025 COMPLETE CBC W/AUTO DIFF WBC: CPT | Performed by: EMERGENCY MEDICINE

## 2024-06-10 PROCEDURE — 93005 ELECTROCARDIOGRAM TRACING: CPT | Performed by: EMERGENCY MEDICINE

## 2024-06-10 PROCEDURE — 84439 ASSAY OF FREE THYROXINE: CPT | Performed by: EMERGENCY MEDICINE

## 2024-06-10 PROCEDURE — 93010 ELECTROCARDIOGRAM REPORT: CPT | Performed by: INTERNAL MEDICINE

## 2024-06-10 PROCEDURE — 83880 ASSAY OF NATRIURETIC PEPTIDE: CPT | Performed by: EMERGENCY MEDICINE

## 2024-06-10 PROCEDURE — 36415 COLL VENOUS BLD VENIPUNCTURE: CPT

## 2024-06-10 PROCEDURE — 85379 FIBRIN DEGRADATION QUANT: CPT | Performed by: EMERGENCY MEDICINE

## 2024-06-10 PROCEDURE — 99284 EMERGENCY DEPT VISIT MOD MDM: CPT

## 2024-06-10 PROCEDURE — 80053 COMPREHEN METABOLIC PANEL: CPT | Performed by: EMERGENCY MEDICINE

## 2024-06-10 PROCEDURE — 83735 ASSAY OF MAGNESIUM: CPT | Performed by: EMERGENCY MEDICINE

## 2024-06-10 PROCEDURE — 71045 X-RAY EXAM CHEST 1 VIEW: CPT

## 2024-06-10 RX ORDER — ASPIRIN 81 MG/1
324 TABLET, CHEWABLE ORAL ONCE
Status: COMPLETED | OUTPATIENT
Start: 2024-06-10 | End: 2024-06-10

## 2024-06-10 RX ORDER — SODIUM CHLORIDE 0.9 % (FLUSH) 0.9 %
10 SYRINGE (ML) INJECTION AS NEEDED
Status: DISCONTINUED | OUTPATIENT
Start: 2024-06-10 | End: 2024-06-11 | Stop reason: HOSPADM

## 2024-06-10 RX ADMIN — ASPIRIN 324 MG: 81 TABLET, CHEWABLE ORAL at 19:05

## 2024-06-10 NOTE — ED PROVIDER NOTES
"Time: 7:33 PM EDT  Date of encounter:  6/10/2024  Independent Historian/Clinical History and Information was obtained by:   Patient  Chief Complaint: Palpitations    History is limited by: N/A    History of Present Illness:  Patient is a 48 y.o. year old male who presents to the emergency department for evaluation of palpitations.  Patient notes that he had palpitations for several days.  Patient states that the palpitations feel like a flip-flop in his chest.  He states that it feels irregular.  He notes that it is not fast or sustained.  He states the symptoms will last for seconds.  He states it was more prominent today and he had some slight chest tightness and thus he went to urgent care.  After listening to his heart, the urgent care advised him to go to the emergency room.  Patient states he is having no symptoms at this time.  Patient denies any palpitations or chest discomfort specifically.  He states the tightness did not radiate to the neck jaw back or down the arms.  He had no associated shortness of breath, near-syncope, syncope, nausea, unusual sweating or unusual fatigue.  The patient denies a history of hypertension high cholesterol or diabetes.  The patient is a smoker and has family history of coronary disease.  Patient has no prior history of DVT or pulmonary embolism.  Patient's had no recent plane flight, traumatic event or surgery or last 6 weeks.  The patient has no clinical signs of DVT at this time.  The patient does not actively have cancer.  He does note that he did drive in a car for an extended period time last week.    HPI    Patient Care Team  Primary Care Provider: Provider, No Known    Past Medical History:     Allergies   Allergen Reactions    Promethazine Other (See Comments)     \"you'll have to restrain me\" zombie like state with scratching at skin and wild.    Tramadol Itching and Confusion     Past Medical History:   Diagnosis Date    Anxiety     Cancer 2021    Basal cell    " "Depression     GERD (gastroesophageal reflux disease)     Pancreatitis 2024     Past Surgical History:   Procedure Laterality Date    SKIN CANCER EXCISION      6 procedures for skin cancer removal     Family History   Problem Relation Age of Onset    Heart disease Other     Skin cancer Other     Colon cancer Neg Hx        Home Medications:  Prior to Admission medications    Medication Sig Start Date End Date Taking? Authorizing Provider   omeprazole (priLOSEC) 20 MG capsule Take 1 capsule by mouth Daily.    Provider, Historical, MD        Social History:   Social History     Tobacco Use    Smoking status: Never    Smokeless tobacco: Never   Vaping Use    Vaping status: Every Day   Substance Use Topics    Alcohol use: Yes     Alcohol/week: 1.0 standard drink of alcohol     Types: 1 Cans of beer per week     Comment: socially    Drug use: Never         Review of Systems:  Review of Systems   Constitutional:  Negative for chills, diaphoresis and fever.   HENT:  Negative for congestion, postnasal drip, rhinorrhea and sore throat.    Eyes:  Negative for photophobia.   Respiratory:  Positive for chest tightness. Negative for cough and shortness of breath.    Cardiovascular:  Positive for palpitations. Negative for chest pain and leg swelling.   Gastrointestinal:  Negative for abdominal pain, diarrhea, nausea and vomiting.   Genitourinary:  Negative for difficulty urinating, dysuria, flank pain, frequency, hematuria and urgency.   Musculoskeletal:  Negative for neck pain and neck stiffness.   Skin:  Negative for pallor and rash.   Neurological:  Negative for dizziness, syncope, weakness, numbness and headaches.   Hematological:  Negative for adenopathy. Does not bruise/bleed easily.   Psychiatric/Behavioral: Negative.          Physical Exam:  /72   Pulse 52   Temp 98 °F (36.7 °C) (Oral)   Resp 18   Ht 167.6 cm (65.98\")   Wt 68 kg (150 lb)   SpO2 95%   BMI 24.22 kg/m²     Physical Exam  Vitals and nursing note " reviewed.   Constitutional:       General: He is not in acute distress.     Appearance: Normal appearance. He is not ill-appearing, toxic-appearing or diaphoretic.   HENT:      Head: Normocephalic and atraumatic.      Mouth/Throat:      Mouth: Mucous membranes are moist.   Eyes:      Pupils: Pupils are equal, round, and reactive to light.   Neck:      Thyroid: No thyroid mass, thyromegaly or thyroid tenderness.   Cardiovascular:      Rate and Rhythm: Normal rate and regular rhythm.      Pulses: Normal pulses.           Carotid pulses are 2+ on the right side and 2+ on the left side.       Radial pulses are 2+ on the right side and 2+ on the left side.        Femoral pulses are 2+ on the right side and 2+ on the left side.       Popliteal pulses are 2+ on the right side and 2+ on the left side.        Dorsalis pedis pulses are 2+ on the right side and 2+ on the left side.        Posterior tibial pulses are 2+ on the right side and 2+ on the left side.      Heart sounds: Normal heart sounds. No murmur heard.  Pulmonary:      Effort: Pulmonary effort is normal. No accessory muscle usage, respiratory distress or retractions.      Breath sounds: Normal breath sounds. No wheezing, rhonchi or rales.   Abdominal:      General: Abdomen is flat. There is no distension.      Palpations: Abdomen is soft. There is no mass or pulsatile mass.      Tenderness: There is no abdominal tenderness. There is no right CVA tenderness, left CVA tenderness, guarding or rebound.      Comments: No rigidity   Musculoskeletal:         General: No swelling, tenderness or deformity.      Cervical back: Neck supple. No tenderness.      Right lower leg: No edema.      Left lower leg: No edema.   Skin:     General: Skin is warm and dry.      Capillary Refill: Capillary refill takes less than 2 seconds.      Coloration: Skin is not jaundiced or pale.      Findings: No erythema.   Neurological:      General: No focal deficit present.      Mental  Status: He is alert and oriented to person, place, and time. Mental status is at baseline.      Cranial Nerves: Cranial nerves 2-12 are intact. No cranial nerve deficit.      Sensory: Sensation is intact. No sensory deficit.      Motor: Motor function is intact. No weakness or pronator drift.      Coordination: Coordination is intact. Coordination normal.   Psychiatric:         Mood and Affect: Mood normal.         Behavior: Behavior normal.                  Procedures:  Procedures      Medical Decision Making:      Comorbidities that affect care:    Pancreatitis, anxiety, depression, GERD, smoking    External Notes reviewed:    None      The following orders were placed and all results were independently analyzed by me:  Orders Placed This Encounter   Procedures    XR Chest 1 View    [place] Draw    High Sensitivity Troponin T    Comprehensive Metabolic Panel    Lipase    BNP    Magnesium    CBC Auto Differential    High Sensitivity Troponin T 2Hr    T4, Free    TSH    D-dimer, Quantitative    NPO Diet NPO Type: Strict NPO    Undress & Gown    Continuous Pulse Oximetry    Oxygen Therapy- Nasal Cannula; Titrate 1-6 LPM Per SpO2; 90 - 95%    ECG 12 Lead ED Triage Standing Order; Chest Pain    ECG 12 Lead ED Triage Standing Order; Chest Pain    Insert Peripheral IV    CBC & Differential    Green Top (Gel)    Lavender Top    Gold Top - SST    Light Blue Top       Medications Given in the Emergency Department:  Medications   sodium chloride 0.9 % flush 10 mL (has no administration in time range)   aspirin chewable tablet 324 mg (324 mg Oral Given 6/10/24 1905)        ED Course:    ED Course as of 06/10/24 2325   Mon [place]ahad 10, 2024   1847 EKG:    Rhythm: Sinus rhythm  Rate: 67  Intervals: Normal WA and QT interval  T-wave: Nonspecific T wave flattening  ST Segment: No pathological ST elevation or reciprocal ST depression to suggest STEMI    EKG Comparison: No EKG available for comparison    Interpreted by me   [SD]   5572  EKG:    Rhythm: Sinus bradycardia  Rate: 56  Intervals: Normal TN and QT interval  T-wave: Isolated nonspecific T wave inversion III, nonspecific T wave flattening  ST Segment: No pathological ST elevation or reciprocal ST depression to suggest MI    EKG Comparison: No change in the QRS and ST morphology from the EKG that was performed earlier in the department    Interpreted by me   [SD]      ED Course User Index  [SD] Wilmar Cano DO       Labs:    Lab Results (last 24 hours)       Procedure Component Value Units Date/Time    High Sensitivity Troponin T [[id number]]  (Normal) Collected: 06/10/24 1858    Specimen: Blood Updated: 06/10/24 1935     HS Troponin T 8 ng/L     Narrative:      High Sensitive Troponin T Reference Range:  <14.0 ng/L- Negative Female for AMI  <22.0 ng/L- Negative Male for AMI  >=14 - Abnormal Female indicating possible myocardial injury.  >=22 - Abnormal Male indicating possible myocardial injury.   Clinicians would have to utilize clinical acumen, EKG, Troponin, and serial changes to determine if it is an Acute Myocardial Infarction or myocardial injury due to an underlying chronic condition.         CBC & Differential [[id number]]  (Normal) Collected: 06/10/24 1858    Specimen: Blood Updated: 06/10/24 1907    Narrative:      The following orders were created for panel order CBC & Differential.  Procedure                               Abnormality         Status                     ---------                               -----------         ------                     CBC Auto Differential[[id number]]        Normal              Final result                 Please view results for these tests on the individual orders.    Comprehensive Metabolic Panel [[id number]]  (Abnormal) Collected: 06/10/24 1858    Specimen: Blood Updated: 06/10/24 1935     Glucose 100 mg/dL      BUN 10 mg/dL      Creatinine 1.09 mg/dL      Sodium 141 mmol/L      Potassium 3.9 mmol/L      Chloride 103 mmol/L      CO2 25.6  mmol/L      Calcium 9.1 mg/dL      Total Protein 7.0 g/dL      Albumin 4.6 g/dL      ALT (SGPT) 20 U/L      AST (SGOT) 16 U/L      Alkaline Phosphatase 69 U/L      Total Bilirubin 0.6 mg/dL      Globulin 2.4 gm/dL      A/G Ratio 1.9 g/dL      BUN/Creatinine Ratio 9.2     Anion Gap 12.4 mmol/L      eGFR 83.2 mL/min/1.73     Narrative:      GFR Normal >60  Chronic Kidney Disease <60  Kidney Failure <15      Lipase [[id number]]  (Normal) Collected: 06/10/24 1858    Specimen: Blood Updated: 06/10/24 1935     Lipase 37 U/L     BNP [[id number]]  (Normal) Collected: 06/10/24 1858    Specimen: Blood Updated: 06/10/24 1926     proBNP <36.0 pg/mL     Narrative:      This assay is used as an aid in the diagnosis of individuals suspected of having heart failure. It can be used as an aid in the diagnosis of acute decompensated heart failure (ADHF) in patients presenting with signs and symptoms of ADHF to the emergency department (ED). In addition, NT-proBNP of <300 pg/mL indicates ADHF is not likely.    Age Range Result Interpretation  NT-proBNP Concentration (pg/mL:      <50             Positive            >450                   Gray                 300-450                    Negative             <300    50-75           Positive            >900                  Gray                300-900                  Negative            <300      >75             Positive            >1800                  Gray                300-1800                  Negative            <300    Magnesium [[id number]]  (Normal) Collected: 06/10/24 1858    Specimen: Blood Updated: 06/10/24 1935     Magnesium 2.0 mg/dL     CBC Auto Differential [[id number]]  (Normal) Collected: 06/10/24 1858    Specimen: Blood Updated: 06/10/24 1907     WBC 7.85 10*3/mm3      RBC 4.96 10*6/mm3      Hemoglobin 14.3 g/dL      Hematocrit 42.6 %      MCV 85.9 fL      MCH 28.8 pg      MCHC 33.6 g/dL      RDW 13.2 %      RDW-SD 40.4 fl      MPV 9.8 fL      Platelets 187 10*3/mm3       "Neutrophil % 53.4 %      Lymphocyte % 36.7 %      Monocyte % 5.5 %      Eosinophil % 3.1 %      Basophil % 1.0 %      Immature Grans % 0.3 %      Neutrophils, Absolute 4.20 10*3/mm3      Lymphocytes, Absolute 2.88 10*3/mm3      Monocytes, Absolute 0.43 10*3/mm3      Eosinophils, Absolute 0.24 10*3/mm3      Basophils, Absolute 0.08 10*3/mm3      Immature Grans, Absolute 0.02 10*3/mm3      nRBC 0.0 /100 WBC     T4, Free [[id number]]  (Normal) Collected: 06/10/24 1858    Specimen: Blood Updated: 06/10/24 2027     Free T4 1.11 ng/dL     TSH [[id number]]  (Normal) Collected: 06/10/24 1858    Specimen: Blood Updated: 06/10/24 2027     TSH 2.510 uIU/mL     D-dimer, Quantitative [[id number]]  (Normal) Collected: 06/10/24 1858    Specimen: Blood Updated: 06/10/24 2035     D-Dimer, Quantitative 0.43 MCGFEU/mL     Narrative:      According to the assay 's published package insert, a normal (<0.50 MCGFEU/mL) D-dimer result in conjunction with a non-high clinical probability assessment, excludes deep vein thrombosis (DVT) and pulmonary embolism (PE) with high sensitivity.    D-dimer values increase with age and this can make VTE exclusion of an older population difficult. To address this, the [employer], based on best available evidence and recent guidelines, recommends that clinicians use age-adjusted D-dimer thresholds in patients greater than 50 years of age with: a) a low probability of PE who do not meet all Pulmonary Embolism Rule Out Criteria, or b) in those with intermediate probability of PE.   The formula for an age-adjusted D-dimer cut-off is \"age/100\".  For example, a 60 year old patient would have an age-adjusted cut-off of 0.60 MCGFEU/mL and an 80 year old 0.80 MCGFEU/mL.    High Sensitivity Troponin T 2Hr [[id number]]  (Normal) Collected: 06/10/24 2110    Specimen: Blood Updated: 06/10/24 2250     HS Troponin T 6 ng/L      Troponin T Delta -2 ng/L     Narrative:      High Sensitive " Troponin T Reference Range:  <14.0 ng/L- Negative Female for AMI  <22.0 ng/L- Negative Male for AMI  >=14 - Abnormal Female indicating possible myocardial injury.  >=22 - Abnormal Male indicating possible myocardial injury.   Clinicians would have to utilize clinical acumen, EKG, Troponin, and serial changes to determine if it is an Acute Myocardial Infarction or myocardial injury due to an underlying chronic condition.                  Imaging:    XR Chest 1 View    Result Date: 6/10/2024  XR CHEST 1 VW Date of Exam: 6/10/2024 7:02 PM EDT Indication: Chest Pain Triage Protocol Comparison: Chest radiograph 2/13/2024 Findings: Lung volumes low. Heart size normal. Negative for lobar consolidation, edema, effusion or pneumothorax. Osseous structures without acute abnormality.     Impression: No active pulmonary process. Electronically Signed: Cale Alexandra MD  6/10/2024 7:36 PM EDT  Workstation ID: [id number]       Differential Diagnosis and Discussion:    Palpitations: Differential diagnosis includes but is not limited to anxiety, atrioventricular blocks, mitral valve disease, hypoxia, coronary artery disease, hypokalemia, anemia, fever, COPD, congestive heart failure, pericarditis, Tom-Parkinson-White syndrome, pulmonary embolism, SVT, atrial fibrillation, atrial flutter, sinus tachycardia, thyrotoxicosis, and pheochromocytoma.    All labs were reviewed and interpreted by me.  All X-rays impressions were independently interpreted by me.  EKG was interpreted by me.    MDM  Number of Diagnoses or Management Options  Chest discomfort  Palpitations  Diagnosis management comments: The patient's CBC was reviewed and shows no abnormalities of critical concern.  Of note, there is no anemia requiring a blood transfusion and the platelet count is acceptable    The patient's CMP was reviewed and shows no abnormalities of critical concern.  Of note, the patient's sodium and potassium are acceptable.  The patient's liver  "enzymes are unremarkable.  The patient's renal function including creatinine is preserved.  The patient has a normal anion gap.    Wells' Criteria for Pulmonary Embolism - MDCalc  Calculated on Fahad 10 2024 11:23 PM  0.0 points -> Low risk group: 1.3% chance of PE in an ED population. Another study assigned scores = 4 as \"PE Unlikely\" and had a 3% incidence of PE.    The patient's D-dimer was normal    The patient had a low pretest clinical probability Wells score for pulmonary embolism.  The patient has a normal D-dimer.  The patient understands that they are low risk for pulmonary embolism.  I discussed performing a CAT scan of the chest to rule out pulmonary embolism with the patient.  Due to the fact that the patient is low risk for a pulmonary embolism and then discussing the risks of radiation exposure from the CAT scan,  the patient does not want to have a CAT scan performed at this time.      The patient had 2 high sensitivity troponins that were within normal limits.  The patient had 2 EKGs that demonstrated no evidence of ST segment elevation MI or other injury pattern    HEART Score for Major Cardiac Events - MDCalc  Calculated on Fahad 10 2024 11:24 PM  3 points -> Low Score (0-3 points) Risk of MACE of 0.9-1.7%.    The patient appears well, in no distress and nontoxic.  The patient is resting comfortably.  The patient has a low heart score.  I have discussed the significance of the heart score with them.  The patient understands that they have a low risk for cardiovascular event over the next 6 weeks.  Understanding the risks, they feel comfortable to be discharged home and to follow-up with the cardiologist on an outpatient basis for stress test.   In the interim, the patient does understand should they develop worsening chest pain, near syncope, syncope, extreme fatigue, worsening shortness of breath or diaphoresis to return back to emergency room immediately.       Amount and/or Complexity of Data " Reviewed  Clinical lab tests: reviewed  Tests in the radiology section of CPT®: reviewed  Tests in the medicine section of CPT®: reviewed             Social Determinants of Health:    Patient is independent, reliable, and has access to care.       Disposition and Care Coordination:            Final diagnoses:   Palpitations   Chest discomfort        ED Disposition       ED Disposition   Discharge    Condition   Stable    Comment   --               This medical record created using voice recognition software.             Wilmar Cano DO  06/10/24 1935       Wilmar Cano DO  06/10/24 2024       Wilmar Cano DO  06/10/24 [id number]

## 2024-06-11 LAB
QT INTERVAL: 384 MS
QT INTERVAL: 419 MS
QTC INTERVAL: 404 MS
QTC INTERVAL: 407 MS

## 2024-06-11 NOTE — DISCHARGE INSTRUCTIONS
Please avoid all stimulants including caffeine and energy drinks until released by the cardiologist    Please discuss the need for cardiac stress echocardiogram and Holter monitor with the cardiologist    No strenuous activity until released by the cardiologist.      Please start on a daily baby aspirin.     Please return to the emergency room for worsening chest pain, radiating chest pain, shortness of breath, near passing out, passing out, unusual fatigue, unusual sweating, nausea or vomiting or new or worrisome symptoms

## 2024-07-01 ENCOUNTER — OFFICE VISIT (OUTPATIENT)
Dept: INTERNAL MEDICINE | Facility: CLINIC | Age: 49
End: 2024-07-01
Payer: OTHER GOVERNMENT

## 2024-07-01 VITALS
DIASTOLIC BLOOD PRESSURE: 64 MMHG | BODY MASS INDEX: 24.04 KG/M2 | SYSTOLIC BLOOD PRESSURE: 112 MMHG | WEIGHT: 149.6 LBS | HEIGHT: 66 IN | TEMPERATURE: 97.5 F | OXYGEN SATURATION: 99 % | HEART RATE: 71 BPM

## 2024-07-01 DIAGNOSIS — Z12.5 SCREENING FOR PROSTATE CANCER: ICD-10-CM

## 2024-07-01 DIAGNOSIS — Z13.9 SCREENING FOR CONDITION: ICD-10-CM

## 2024-07-01 DIAGNOSIS — Z82.49 FAMILY HISTORY OF HEART ATTACK: ICD-10-CM

## 2024-07-01 DIAGNOSIS — Z76.89 ENCOUNTER TO ESTABLISH CARE: Primary | ICD-10-CM

## 2024-07-01 DIAGNOSIS — R25.2 MUSCLE CRAMPING: ICD-10-CM

## 2024-07-01 DIAGNOSIS — R00.2 PALPITATIONS: ICD-10-CM

## 2024-07-01 DIAGNOSIS — Z87.19 HISTORY OF PANCREATITIS: ICD-10-CM

## 2024-07-01 DIAGNOSIS — K21.9 GASTROESOPHAGEAL REFLUX DISEASE, UNSPECIFIED WHETHER ESOPHAGITIS PRESENT: ICD-10-CM

## 2024-07-01 DIAGNOSIS — R23.3 EASY BRUISING: ICD-10-CM

## 2024-07-01 DIAGNOSIS — R73.09 ELEVATED GLUCOSE: ICD-10-CM

## 2024-07-01 LAB
ALBUMIN SERPL-MCNC: 4.8 G/DL (ref 3.5–5.2)
ALBUMIN/GLOB SERPL: 1.8 G/DL
ALP SERPL-CCNC: 63 U/L (ref 39–117)
ALT SERPL W P-5'-P-CCNC: 26 U/L (ref 1–41)
ANION GAP SERPL CALCULATED.3IONS-SCNC: 9 MMOL/L (ref 5–15)
AST SERPL-CCNC: 22 U/L (ref 1–40)
BASOPHILS # BLD AUTO: 0.06 10*3/MM3 (ref 0–0.2)
BASOPHILS NFR BLD AUTO: 0.9 % (ref 0–1.5)
BILIRUB SERPL-MCNC: 0.6 MG/DL (ref 0–1.2)
BUN SERPL-MCNC: 7 MG/DL (ref 6–20)
BUN/CREAT SERPL: 7.4 (ref 7–25)
CALCIUM SPEC-SCNC: 10 MG/DL (ref 8.6–10.5)
CHLORIDE SERPL-SCNC: 103 MMOL/L (ref 98–107)
CHOLEST SERPL-MCNC: 152 MG/DL (ref 0–200)
CO2 SERPL-SCNC: 27 MMOL/L (ref 22–29)
CREAT SERPL-MCNC: 0.95 MG/DL (ref 0.76–1.27)
DEPRECATED RDW RBC AUTO: 42.9 FL (ref 37–54)
EGFRCR SERPLBLD CKD-EPI 2021: 97.5 ML/MIN/1.73
EOSINOPHIL # BLD AUTO: 0.25 10*3/MM3 (ref 0–0.4)
EOSINOPHIL NFR BLD AUTO: 3.9 % (ref 0.3–6.2)
ERYTHROCYTE [DISTWIDTH] IN BLOOD BY AUTOMATED COUNT: 13.7 % (ref 12.3–15.4)
FERRITIN SERPL-MCNC: 78.2 NG/ML (ref 30–400)
GLOBULIN UR ELPH-MCNC: 2.6 GM/DL
GLUCOSE SERPL-MCNC: 95 MG/DL (ref 65–99)
HBA1C MFR BLD: 5.8 % (ref 4.8–5.6)
HCT VFR BLD AUTO: 46.3 % (ref 37.5–51)
HCV AB SER QL: NORMAL
HDLC SERPL-MCNC: 36 MG/DL (ref 40–60)
HGB BLD-MCNC: 15.4 G/DL (ref 13–17.7)
IMM GRANULOCYTES # BLD AUTO: 0.02 10*3/MM3 (ref 0–0.05)
IMM GRANULOCYTES NFR BLD AUTO: 0.3 % (ref 0–0.5)
INR PPP: 0.93 (ref 0.86–1.15)
IRON 24H UR-MRATE: 78 MCG/DL (ref 59–158)
IRON SATN MFR SERPL: 17 % (ref 20–50)
LDLC SERPL CALC-MCNC: 86 MG/DL (ref 0–100)
LDLC/HDLC SERPL: 2.27 {RATIO}
LYMPHOCYTES # BLD AUTO: 1.8 10*3/MM3 (ref 0.7–3.1)
LYMPHOCYTES NFR BLD AUTO: 28.1 % (ref 19.6–45.3)
MCH RBC QN AUTO: 29.2 PG (ref 26.6–33)
MCHC RBC AUTO-ENTMCNC: 33.3 G/DL (ref 31.5–35.7)
MCV RBC AUTO: 87.7 FL (ref 79–97)
MONOCYTES # BLD AUTO: 0.39 10*3/MM3 (ref 0.1–0.9)
MONOCYTES NFR BLD AUTO: 6.1 % (ref 5–12)
NEUTROPHILS NFR BLD AUTO: 3.88 10*3/MM3 (ref 1.7–7)
NEUTROPHILS NFR BLD AUTO: 60.7 % (ref 42.7–76)
NRBC BLD AUTO-RTO: 0 /100 WBC (ref 0–0.2)
PLATELET # BLD AUTO: 190 10*3/MM3 (ref 140–450)
PMV BLD AUTO: 10.3 FL (ref 6–12)
POTASSIUM SERPL-SCNC: 4.5 MMOL/L (ref 3.5–5.2)
PROT SERPL-MCNC: 7.4 G/DL (ref 6–8.5)
PROTHROMBIN TIME: 12.7 SECONDS (ref 11.8–14.9)
PSA SERPL-MCNC: 0.65 NG/ML (ref 0–4)
RBC # BLD AUTO: 5.28 10*6/MM3 (ref 4.14–5.8)
SODIUM SERPL-SCNC: 139 MMOL/L (ref 136–145)
TIBC SERPL-MCNC: 450 MCG/DL (ref 298–536)
TRANSFERRIN SERPL-MCNC: 302 MG/DL (ref 200–360)
TRIGL SERPL-MCNC: 172 MG/DL (ref 0–150)
VLDLC SERPL-MCNC: 30 MG/DL (ref 5–40)
WBC NRBC COR # BLD AUTO: 6.4 10*3/MM3 (ref 3.4–10.8)

## 2024-07-01 PROCEDURE — 86803 HEPATITIS C AB TEST: CPT | Performed by: NURSE PRACTITIONER

## 2024-07-01 PROCEDURE — 99214 OFFICE O/P EST MOD 30 MIN: CPT | Performed by: NURSE PRACTITIONER

## 2024-07-01 PROCEDURE — 83036 HEMOGLOBIN GLYCOSYLATED A1C: CPT | Performed by: NURSE PRACTITIONER

## 2024-07-01 PROCEDURE — 84466 ASSAY OF TRANSFERRIN: CPT | Performed by: NURSE PRACTITIONER

## 2024-07-01 PROCEDURE — 82728 ASSAY OF FERRITIN: CPT | Performed by: NURSE PRACTITIONER

## 2024-07-01 PROCEDURE — 80061 LIPID PANEL: CPT | Performed by: NURSE PRACTITIONER

## 2024-07-01 PROCEDURE — G0103 PSA SCREENING: HCPCS | Performed by: NURSE PRACTITIONER

## 2024-07-01 PROCEDURE — 85025 COMPLETE CBC W/AUTO DIFF WBC: CPT | Performed by: NURSE PRACTITIONER

## 2024-07-01 PROCEDURE — 85610 PROTHROMBIN TIME: CPT | Performed by: NURSE PRACTITIONER

## 2024-07-01 PROCEDURE — 80053 COMPREHEN METABOLIC PANEL: CPT | Performed by: NURSE PRACTITIONER

## 2024-07-01 PROCEDURE — 36415 COLL VENOUS BLD VENIPUNCTURE: CPT | Performed by: NURSE PRACTITIONER

## 2024-07-01 PROCEDURE — 83540 ASSAY OF IRON: CPT | Performed by: NURSE PRACTITIONER

## 2024-07-01 RX ORDER — CYCLOBENZAPRINE HCL 5 MG
5 TABLET ORAL 3 TIMES DAILY PRN
Qty: 30 TABLET | Refills: 0 | Status: SHIPPED | OUTPATIENT
Start: 2024-07-01

## 2024-07-01 RX ORDER — IBUPROFEN 800 MG/1
800 TABLET ORAL 3 TIMES DAILY
Qty: 15 TABLET | Refills: 0 | Status: SHIPPED | OUTPATIENT
Start: 2024-07-01 | End: 2024-07-06

## 2024-07-01 NOTE — PROGRESS NOTES
Chief Complaint  Establish Care (New patient ), Bleeding/Bruising (Random bruising- pt unaware where it came from.), and Back Pain (X4 days now)    Subjective      Adalberto Toribio is a 49 y.o. male who presents to [facility]     [facility] P[facility]: [facility] while; [facility]  Last labs: 6/2024  PSA: Due; denies family history of prostate cancer  Colonoscopy: Cologuard 2023; [facility] to [facility]; Denies family history of colon cancer  COVID19 vaccination: Declines   Shingles vaccination: Will consider at pharmacy  Eye exam: Due   Dental exam: Due   Smoking history: Vapes; Quit smoking 1.5 years ago  Specialists: GI  Surgeries: None     Patient in clinic to establish care.  Reports family history of heart attack in mother. Denies chest pain, blurry vision, headache, leg swelling, shortness of breath, seizure activity.  Patient was evaluated in the ER after 4 days of feeling like his heart was beating very hard in his chest.  It also seemed to be more rapid than normal.  He did not have associated chest pain or shortness of breath.  ER evaluation was normal, it was recommended that he see cardiology.    GERD-  Takes PPI every other day, symptoms are well controlled.    Lumbago-  Gets injections in L4-L5 through [facility]. Reports a recent flare x 4 days. Denies injury. Denies sciatica, weakness of legs, loss of bowel/bladder function. He has not been treating with anything at this time. States pain feels muscular. Has not had an MRI recently.  Patient also reports chronic muscle cramping, recent magnesium levels normal.    Pancreatitis-  Patient reports an episode of pancreatitis x 1. No known cause. Patient does not drink alcohol often, no family history. Scheduled August 8th with the GI specialist for further evaluation.  He has also noticed unexplained bruising, states he woke up and had a bruise on his stomach. A few days  "later had one on his bicep. No known cause. Seemed to coincide with the symptoms he was having in his chest. Bruising has resolved and not returned.     Objective   Vital Signs:   Vitals:    07/01/24 0804   BP: 112/64   BP Location: Left arm   Patient Position: Sitting   Cuff Size: Adult   Pulse: 71   Temp: 97.5 °F (36.4 °C)   TempSrc: Temporal   SpO2: 99%   Weight: 67.9 kg (149 lb 9.6 oz)   Height: 167.6 cm (65.98\")     Body mass index is 24.16 kg/m².    Wt Readings from Last 3 Encounters:   07/01/24 67.9 kg (149 lb 9.6 oz)   06/10/24 68 kg (150 lb)   06/10/24 67.6 kg (149 lb 1.6 oz)     BP Readings from Last 3 Encounters:   07/01/24 112/64   06/10/24 113/81   06/10/24 126/66       Health Maintenance   Topic Date Due    COLORECTAL CANCER SCREENING  Never done    HEPATITIS C SCREENING  Never done    ANNUAL PHYSICAL  Never done    COVID-19 Vaccine (3 - 2023-24 season) 09/01/2023    ZOSTER VACCINE (1 of 2) Never done    INFLUENZA VACCINE  08/01/2024    TDAP/TD VACCINES (2 - Td or Tdap) 04/07/2033    Pneumococcal Vaccine 0-64  Aged Out       Physical Exam  Constitutional:       Appearance: Normal appearance.   HENT:      Head: Normocephalic and atraumatic.      Right Ear: Tympanic membrane normal.      Left Ear: Tympanic membrane normal.      Nose: Nose normal.      Mouth/Throat:      Mouth: Mucous membranes are moist.      Pharynx: Oropharynx is clear.   Eyes:      Extraocular Movements: Extraocular movements intact.      Conjunctiva/sclera: Conjunctivae normal.      Pupils: Pupils are equal, round, and reactive to light.   Neck:      Thyroid: No thyroid mass, thyromegaly or thyroid tenderness.   Cardiovascular:      Rate and Rhythm: Normal rate and regular rhythm.      Heart sounds: Normal heart sounds.   Pulmonary:      Effort: Pulmonary effort is normal.      Breath sounds: Normal breath sounds.   Skin:     General: Skin is warm and dry.   Neurological:      General: No focal deficit present.      Mental Status: He " is alert and oriented to person, place, and time.   Psychiatric:         Mood and Affect: Mood normal.         Behavior: Behavior normal.         Thought Content: Thought content normal.          Result Review :  The following data was reviewed by: IHSAN Pena on 07/01/2024:         Procedures          Assessment & Plan  Encounter to establish care  Basic labs in clinic today. Encouraged routine dental and eye exams. Discussed age appropriate immunizations, screenings.   Gastroesophageal reflux disease, unspecified whether esophagitis present  Well controlled, continue PPI.  History of pancreatitis  He will establish with GI as scheduled.  Palpitations  ER evaluation normal.  Will schedule for stress test due to symptoms and family history.  Referral to cardiology for further evaluation, discussed likelihood of Holter and/or echo.  Family history of heart attack    Screening for condition  Hepatitis C screening with labs.  Elevated glucose  A1c with labs.  Muscle cramping  Routine labs today, including iron studies.  Easy bruising  PT/NR with labs.  Screening for prostate cancer  PSA today.    Orders Placed This Encounter   Procedures    Protime-INR    Comprehensive Metabolic Panel    Lipid Panel    Hemoglobin A1c    Ferritin    Iron Profile    PSA Screen    Hepatitis C Antibody    CBC Auto Differential    Ambulatory Referral to Cardiology    Treadmill Stress Test    CBC & Differential     New Medications Ordered This Visit   Medications    cyclobenzaprine (FLEXERIL) 5 MG tablet     Sig: Take 1 tablet by mouth 3 (Three) Times a Day As Needed for Muscle Spasms.     Dispense:  30 tablet     Refill:  0    ibuprofen (ADVIL,MOTRIN) 800 MG tablet     Sig: Take 1 tablet by mouth 3 (Three) Times a Day for 5 days.     Dispense:  15 tablet     Refill:  0          BMI is within normal parameters. No other follow-up for BMI required.         FOLLOW UP  Return in about 6 months (around 1/1/2025).  Patient was given  instructions and counseling regarding his condition or for health maintenance advice. Please see specific information pulled into the AVS if appropriate.       Debra Juarez, IHSAN  07/01/24  09:13 EDT    CURRENT & DISCONTINUED MEDICATIONS  Current Outpatient Medications   Medication Instructions    cyclobenzaprine (FLEXERIL) 5 mg, Oral, 3 Times Daily PRN    ibuprofen (ADVIL,MOTRIN) 800 mg, Oral, 3 Times Daily    omeprazole (PRILOSEC) 20 mg, Oral, Daily       There are no discontinued medications.

## 2024-07-02 ENCOUNTER — PATIENT ROUNDING (BHMG ONLY) (OUTPATIENT)
Dept: INTERNAL MEDICINE | Facility: CLINIC | Age: 49
End: 2024-07-02
Payer: OTHER GOVERNMENT

## 2024-07-12 ENCOUNTER — HOSPITAL ENCOUNTER (OUTPATIENT)
Dept: CARDIOLOGY | Facility: HOSPITAL | Age: 49
Discharge: HOME | End: 2024-07-12
Admitting: NURSE PRACTITIONER
Payer: OTHER GOVERNMENT

## 2024-07-12 VITALS — WEIGHT: 150 LBS | HEIGHT: 66 IN | BODY MASS INDEX: 24.11 KG/M2

## 2024-07-12 DIAGNOSIS — Z82.49 FAMILY HISTORY OF HEART ATTACK: ICD-10-CM

## 2024-07-12 DIAGNOSIS — R00.2 PALPITATIONS: ICD-10-CM

## 2024-07-12 LAB
BH CV IMMEDIATE POST RECOVERY TECH DATA SYMPTOMS: NORMAL
BH CV IMMEDIATE POST TECH DATA BLOOD PRESSURE: NORMAL MMHG
BH CV IMMEDIATE POST TECH DATA HEART RATE: 179 BPM
BH CV IMMEDIATE POST TECH DATA OXYGEN SATS: 97 %
BH CV NINE MINUTE RECOVERY TECH DATA BLOOD PRESSURE: NORMAL MMHG
BH CV NINE MINUTE RECOVERY TECH DATA HEART RATE: 121 BPM
BH CV NINE MINUTE RECOVERY TECH DATA SYMPTOMS: NORMAL
BH CV SIX MINUTE RECOVERY TECH DATA BLOOD PRESSURE: NORMAL
BH CV SIX MINUTE RECOVERY TECH DATA HEART RATE: 129 BPM
BH CV SIX MINUTE RECOVERY TECH DATA SYMPTOMS: NORMAL
BH CV STRESS BP STAGE 1: NORMAL
BH CV STRESS BP STAGE 2: NORMAL
BH CV STRESS BP STAGE 3: NORMAL
BH CV STRESS BP STAGE 4: NORMAL
BH CV STRESS DURATION MIN STAGE 1: 3
BH CV STRESS DURATION MIN STAGE 2: 3
BH CV STRESS DURATION MIN STAGE 3: 3
BH CV STRESS DURATION MIN STAGE 4: 3
BH CV STRESS DURATION SEC STAGE 1: 0
BH CV STRESS DURATION SEC STAGE 2: 0
BH CV STRESS DURATION SEC STAGE 3: 0
BH CV STRESS DURATION SEC STAGE 4: 0
BH CV STRESS GRADE STAGE 1: 10
BH CV STRESS GRADE STAGE 2: 12
BH CV STRESS GRADE STAGE 3: 14
BH CV STRESS GRADE STAGE 4: 16
BH CV STRESS HR STAGE 1: 107
BH CV STRESS HR STAGE 2: 136
BH CV STRESS HR STAGE 3: 167
BH CV STRESS HR STAGE 4: 185
BH CV STRESS METS STAGE 1: 5
BH CV STRESS METS STAGE 2: 7.5
BH CV STRESS METS STAGE 3: 10
BH CV STRESS METS STAGE 4: 13.5
BH CV STRESS O2 STAGE 2: 98
BH CV STRESS O2 STAGE 4: 94
BH CV STRESS PROTOCOL 1: NORMAL
BH CV STRESS RECOVERY BP: NORMAL MMHG
BH CV STRESS RECOVERY HR: 104 BPM
BH CV STRESS RECOVERY O2: 97 %
BH CV STRESS SPEED STAGE 1: 1.7
BH CV STRESS SPEED STAGE 2: 2.5
BH CV STRESS SPEED STAGE 3: 3.4
BH CV STRESS SPEED STAGE 4: 4.2
BH CV STRESS STAGE 1: 1
BH CV STRESS STAGE 2: 2
BH CV STRESS STAGE 3: 3
BH CV STRESS STAGE 4: 4
BH CV THREE MINUTE POST TECH DATA BLOOD PRESSURE: NORMAL MMHG
BH CV THREE MINUTE POST TECH DATA HEART RATE: 148 BPM
BH CV TWELVE MINUTE RECOVERY TECH DATA BLOOD PRESSURE: NORMAL MMHG
BH CV TWELVE MINUTE RECOVERY TECH DATA HEART RATE: 117 BPM
BH CV TWELVE MINUTE RECOVERY TECH DATA SYMPTOMS: NORMAL
MAXIMAL PREDICTED HEART RATE: 170 BPM
PERCENT MAX PREDICTED HR: 105.29 %
STRESS BASELINE BP: NORMAL MMHG
STRESS BASELINE HR: 70 BPM
STRESS O2 SAT REST: 97 %
STRESS PERCENT HR: 124 %
STRESS POST ESTIMATED WORKLOAD: 13.5 METS
STRESS POST EXERCISE DUR MIN: 12 MIN
STRESS POST EXERCISE DUR SEC: 0 SEC
STRESS POST O2 SAT PEAK: 94 %
STRESS POST PEAK BP: NORMAL MMHG
STRESS POST PEAK HR: 179 BPM
STRESS TARGET HR: 145 BPM

## 2024-07-12 PROCEDURE — 93017 CV STRESS TEST TRACING ONLY: CPT

## 2024-08-27 PROBLEM — R00.2 PALPITATIONS: Status: ACTIVE | Noted: 2024-08-27

## 2024-08-27 NOTE — PROGRESS NOTES
"Chief Complaint  Palpitations (Hospital follow up)      History of Present Illness  Adalberto Toribio presents to [facility]  Patient is a 49-year-old with a history of previous pancreatitis who had episodes of palpitation about a month ago went on for about a month or so was seen emergency room EKGs were fairly unremarkable at that time since then the patient symptoms have improved and resolved.  He denies any anginal chest pain no breathing changes no PND orthopnea lower extremity LEONCIO.  No syncope or presyncopal episodes  Past Medical History:   Diagnosis Date    Anxiety     Cancer     Basal cell    Depression     GERD (gastroesophageal reflux disease)     Pancreatitis          Current Outpatient Medications:     omeprazole (priLOSEC) 20 MG capsule, Take 1 capsule by mouth Daily., Disp: , Rfl:     cyclobenzaprine (FLEXERIL) 5 MG tablet, Take 1 tablet by mouth 3 (Three) Times a Day As Needed for Muscle Spasms. (Patient not taking: Reported on 2024), Disp: 30 tablet, Rfl: 0    There are no discontinued medications.  Allergies   Allergen Reactions    Promethazine Other (See Comments)     \"you'll have to restrain me\" zombie like state with scratching at skin and wild.    Tramadol Itching and Confusion        Social History     Tobacco Use    Smoking status: Former     Current packs/day: 0.00     Average packs/day: 0.5 packs/day for 15.0 years (7.5 ttl pk-yrs)     Types: Cigarettes     Quit date: 2023     Years since quittin.6    Smokeless tobacco: Never   Vaping Use    Vaping status: Every Day    Substances: Nicotine, Flavoring    Devices: Disposable   Substance Use Topics    Alcohol use: Yes     Alcohol/week: 1.0 standard drink of alcohol     Types: 1 Cans of beer per week     Comment: socially    Drug use: Never       Family History   Problem Relation Age of Onset    Heart disease Other     Skin cancer Other     Colon cancer Neg Hx         Objective     /80  " " Pulse 76   Ht 167.6 cm (66\")   Wt 69.1 kg (152 lb 6.4 oz)   BMI 24.60 kg/m²       Physical Exam    General Appearance:   no acute distress  Alert and oriented x3  HENT:   lips not cyanotic  Atraumatic  Neck:  No jvd   supple  Respiratory:  no respiratory distress  normal breath sounds  no rales  Cardiovascular:  Regular rate and rhythm  no S3, no S4   no murmur  no rub  Extremities  No cyanosis  lower extremity edema: none    Skin:   warm, dry  No rashes    Result Review :     proBNP   Date Value Ref Range Status   06/10/2024 <36.0 0.0 - 450.0 pg/mL Final     CMP          2/18/2024    04:41 6/10/2024    18:58 7/1/2024    08:55   CMP   Glucose 93  100  95    BUN 10  10  7    Creatinine 0.90  1.09  0.95    EGFR 104.7  83.2  97.5    Sodium 131  141  139    Potassium 3.4  3.9  4.5    Chloride 93  103  103    Calcium 10.0  9.1  10.0    Total Protein 8.1  7.0  7.4    Albumin 4.0  4.6  4.8    Globulin 4.1  2.4  2.6    Total Bilirubin 0.7  0.6  0.6    Alkaline Phosphatase 92  69  63    AST (SGOT) 22  16  22    ALT (SGPT) 20  20  26    Albumin/Globulin Ratio 1.0  1.9  1.8    BUN/Creatinine Ratio 11.1  9.2  7.4    Anion Gap 13.6  12.4  9.0      CBC w/diff          2/16/2024    05:18 6/10/2024    18:58 7/1/2024    08:55   CBC w/Diff   WBC 10.38  7.85  6.40    RBC 4.28  4.96  5.28    Hemoglobin 12.3  14.3  15.4    Hematocrit 37.7  42.6  46.3    MCV 88.1  85.9  87.7    MCH 28.7  28.8  29.2    MCHC 32.6  33.6  33.3    RDW 12.5  13.2  13.7    Platelets 141  187  190    Neutrophil Rel % 77.1  53.4  60.7    Immature Granulocyte Rel % 0.4  0.3  0.3    Lymphocyte Rel % 13.0  36.7  28.1    Monocyte Rel % 8.3  5.5  6.1    Eosinophil Rel % 0.8  3.1  3.9    Basophil Rel % 0.4  1.0  0.9       Lab Results   Component Value Date    TSH 2.510 06/10/2024      Lab Results   Component Value Date    FREET4 1.11 06/10/2024      D-Dimer, Quantitative   Date Value Ref Range Status   06/10/2024 0.43 0.00 - 0.50 MCGFEU/mL Final     Magnesium " "  Date Value Ref Range Status   06/10/2024 2.0 1.6 - 2.6 mg/dL Final      No results found for: \"DIGOXIN\"   Lab Results   Component Value Date    TROPONINT 6 06/10/2024      No results found for: \"POCTROP\"(       Lipid Panel          2/14/2024    04:45 7/1/2024    08:55   Lipid Panel   Total Cholesterol 115  152    Triglycerides 75  172    HDL Cholesterol 36  36    VLDL Cholesterol 15  30    LDL Cholesterol  64  86    LDL/HDL Ratio 1.78  2.27             No results found for this or any previous visit.     Results for orders placed during the hospital encounter of 07/12/24    Treadmill Stress Test    Interpretation Summary    Exercise treadmill stress test is negative for ischemia.    Excellent exercise tolerance noted.  Maximal workload achieved was 13.5 METS.    There was no chest pain at maximal exercise.  There were no arrhythmias.    EKG at peak heart rate showed nonspecific changes, which are not diagnostic of ischemia.          The 10-year ASCVD risk score (Rosana MARTIN, et al., 2019) is: 3.7%    Values used to calculate the score:      Age: 49 years      Sex: Male      Is Non- : No      Diabetic: No      Tobacco smoker: No      Systolic Blood Pressure: 136 mmHg      Is BP treated: No      HDL Cholesterol: 36 mg/dL      Total Cholesterol: 152 mg/dL     Diagnoses and all orders for this visit:    1. Palpitations (Primary)  Assessment & Plan:  Patient with intermittent palpitations most likely benign PACs or PVCs based on EKG is normal he did undergo a stress test which showed good exercise capacity and no ischemic changes.  Recommend at this point reassurance about that his symptoms are resolved with just encourage avoidance of stimulants we will go ahead and get a 48-hour Holter monitor.  If recurrence of issues can try over-the-counter magnesium failing that possibly low-dose beta-blocker or calcium blockers for symptom treatment on as-needed basis    Orders:  -     Holter Monitor - 48 " Hour; Future            Follow Up     No follow-ups on file.          Patient was given instructions and counseling regarding his condition or for health maintenance advice. Please see specific information pulled into the AVS if appropriate.

## 2024-08-28 ENCOUNTER — OFFICE VISIT (OUTPATIENT)
Dept: CARDIOLOGY | Facility: CLINIC | Age: 49
End: 2024-08-28
Payer: OTHER GOVERNMENT

## 2024-08-28 VITALS
HEIGHT: 66 IN | HEART RATE: 76 BPM | BODY MASS INDEX: 24.49 KG/M2 | SYSTOLIC BLOOD PRESSURE: 136 MMHG | WEIGHT: 152.4 LBS | DIASTOLIC BLOOD PRESSURE: 80 MMHG

## 2024-08-28 DIAGNOSIS — R00.2 PALPITATIONS: Primary | ICD-10-CM

## 2024-08-28 PROCEDURE — 99203 OFFICE O/P NEW LOW 30 MIN: CPT | Performed by: INTERNAL MEDICINE

## 2024-08-28 NOTE — ASSESSMENT & PLAN NOTE
Patient with intermittent palpitations most likely benign PACs or PVCs based on EKG is normal he did undergo a stress test which showed good exercise capacity and no ischemic changes.  Recommend at this point reassurance about that his symptoms are resolved with just encourage avoidance of stimulants we will go ahead and get a 48-hour Holter monitor.  If recurrence of issues can try over-the-counter magnesium failing that possibly low-dose beta-blocker or calcium blockers for symptom treatment on as-needed basis

## 2024-09-17 PROCEDURE — 87635 SARS-COV-2 COVID-19 AMP PRB: CPT | Performed by: NURSE PRACTITIONER

## 2024-09-18 ENCOUNTER — TELEPHONE (OUTPATIENT)
Dept: CARDIOLOGY | Facility: CLINIC | Age: 49
End: 2024-09-18
Payer: OTHER GOVERNMENT

## 2024-09-25 ENCOUNTER — APPOINTMENT (OUTPATIENT)
Dept: GENERAL RADIOLOGY | Facility: HOSPITAL | Age: 49
End: 2024-09-25
Payer: OTHER GOVERNMENT

## 2024-09-25 ENCOUNTER — HOSPITAL ENCOUNTER (EMERGENCY)
Facility: HOSPITAL | Age: 49
Discharge: HOME | End: 2024-09-26
Attending: EMERGENCY MEDICINE
Payer: OTHER GOVERNMENT

## 2024-09-25 DIAGNOSIS — R50.9 FEVER, UNSPECIFIED FEVER CAUSE: ICD-10-CM

## 2024-09-25 DIAGNOSIS — B34.9 ACUTE VIRAL SYNDROME: Primary | ICD-10-CM

## 2024-09-25 LAB
ALBUMIN SERPL-MCNC: 4.3 G/DL (ref 3.5–5.2)
ALBUMIN/GLOB SERPL: 1.3 G/DL
ALP SERPL-CCNC: 68 U/L (ref 39–117)
ALT SERPL W P-5'-P-CCNC: 29 U/L (ref 1–41)
ANION GAP SERPL CALCULATED.3IONS-SCNC: 12.9 MMOL/L (ref 5–15)
AST SERPL-CCNC: 19 U/L (ref 1–40)
BASOPHILS # BLD AUTO: 0.06 10*3/MM3 (ref 0–0.2)
BASOPHILS NFR BLD AUTO: 0.5 % (ref 0–1.5)
BILIRUB SERPL-MCNC: 0.9 MG/DL (ref 0–1.2)
BUN SERPL-MCNC: 10 MG/DL (ref 6–20)
BUN/CREAT SERPL: 10.1 (ref 7–25)
CALCIUM SPEC-SCNC: 9.2 MG/DL (ref 8.6–10.5)
CHLORIDE SERPL-SCNC: 101 MMOL/L (ref 98–107)
CO2 SERPL-SCNC: 20.1 MMOL/L (ref 22–29)
CREAT SERPL-MCNC: 0.99 MG/DL (ref 0.76–1.27)
DEPRECATED RDW RBC AUTO: 38.1 FL (ref 37–54)
EGFRCR SERPLBLD CKD-EPI 2021: 92.8 ML/MIN/1.73
EOSINOPHIL # BLD AUTO: 0.01 10*3/MM3 (ref 0–0.4)
EOSINOPHIL NFR BLD AUTO: 0.1 % (ref 0.3–6.2)
ERYTHROCYTE [DISTWIDTH] IN BLOOD BY AUTOMATED COUNT: 12.4 % (ref 12.3–15.4)
FLUAV SUBTYP SPEC NAA+PROBE: NOT DETECTED
FLUBV RNA ISLT QL NAA+PROBE: NOT DETECTED
GLOBULIN UR ELPH-MCNC: 3.2 GM/DL
GLUCOSE SERPL-MCNC: 122 MG/DL (ref 65–99)
HCT VFR BLD AUTO: 40.1 % (ref 37.5–51)
HGB BLD-MCNC: 13.8 G/DL (ref 13–17.7)
IMM GRANULOCYTES # BLD AUTO: 0.05 10*3/MM3 (ref 0–0.05)
IMM GRANULOCYTES NFR BLD AUTO: 0.4 % (ref 0–0.5)
LYMPHOCYTES # BLD AUTO: 0.81 10*3/MM3 (ref 0.7–3.1)
LYMPHOCYTES NFR BLD AUTO: 6.9 % (ref 19.6–45.3)
MCH RBC QN AUTO: 29.2 PG (ref 26.6–33)
MCHC RBC AUTO-ENTMCNC: 34.4 G/DL (ref 31.5–35.7)
MCV RBC AUTO: 84.8 FL (ref 79–97)
MONOCYTES # BLD AUTO: 0.63 10*3/MM3 (ref 0.1–0.9)
MONOCYTES NFR BLD AUTO: 5.4 % (ref 5–12)
NEUTROPHILS NFR BLD AUTO: 10.2 10*3/MM3 (ref 1.7–7)
NEUTROPHILS NFR BLD AUTO: 86.7 % (ref 42.7–76)
NRBC BLD AUTO-RTO: 0 /100 WBC (ref 0–0.2)
PLATELET # BLD AUTO: 182 10*3/MM3 (ref 140–450)
PMV BLD AUTO: 9.6 FL (ref 6–12)
POTASSIUM SERPL-SCNC: 3.7 MMOL/L (ref 3.5–5.2)
PROT SERPL-MCNC: 7.5 G/DL (ref 6–8.5)
QT INTERVAL: 338 MS
QTC INTERVAL: 423 MS
RBC # BLD AUTO: 4.73 10*6/MM3 (ref 4.14–5.8)
RSV RNA NPH QL NAA+NON-PROBE: NOT DETECTED
S PYO AG THROAT QL: NEGATIVE
SARS-COV-2 RNA RESP QL NAA+PROBE: NOT DETECTED
SODIUM SERPL-SCNC: 134 MMOL/L (ref 136–145)
WBC NRBC COR # BLD AUTO: 11.76 10*3/MM3 (ref 3.4–10.8)

## 2024-09-25 PROCEDURE — 71045 X-RAY EXAM CHEST 1 VIEW: CPT

## 2024-09-25 PROCEDURE — 87081 CULTURE SCREEN ONLY: CPT

## 2024-09-25 PROCEDURE — 93005 ELECTROCARDIOGRAM TRACING: CPT

## 2024-09-25 PROCEDURE — 87637 SARSCOV2&INF A&B&RSV AMP PRB: CPT

## 2024-09-25 PROCEDURE — 80053 COMPREHEN METABOLIC PANEL: CPT

## 2024-09-25 PROCEDURE — 99284 EMERGENCY DEPT VISIT MOD MDM: CPT

## 2024-09-25 PROCEDURE — 85025 COMPLETE CBC W/AUTO DIFF WBC: CPT

## 2024-09-25 PROCEDURE — 87880 STREP A ASSAY W/OPTIC: CPT

## 2024-09-25 PROCEDURE — 36415 COLL VENOUS BLD VENIPUNCTURE: CPT

## 2024-09-26 VITALS
WEIGHT: 151.9 LBS | SYSTOLIC BLOOD PRESSURE: 114 MMHG | HEIGHT: 66 IN | RESPIRATION RATE: 18 BRPM | HEART RATE: 106 BPM | OXYGEN SATURATION: 96 % | TEMPERATURE: 98.5 F | DIASTOLIC BLOOD PRESSURE: 69 MMHG | BODY MASS INDEX: 24.41 KG/M2

## 2024-09-26 NOTE — ED PROVIDER NOTES
"Time: 9:44 PM EDT  Date of encounter:  2024  Independent Historian/Clinical History and Information was obtained by:   Patient    History is limited by: N/A    Chief Complaint   Patient presents with    Fever    Headache    Generalized Body Aches         History of Present Illness:  Patient is a 49 y.o. year old male who presents to the emergency department for evaluation of flulike symptoms.  Patient states that today he developed a fever at home of 102 and took Motrin in the fever went away however he has been having generalized bodyaches and weakness as well as a headache.  Patient denies shortness of breath.  Patient had a L4-L5 spinal block performed yesterday and wife stated that the site might be hot.(Patient evaluated in triage by Bailey Seaver, APRN, FNP-C)    Patient Care Team  Primary Care Provider: Debra Juarez APRN    Past Medical History:     Allergies   Allergen Reactions    Promethazine Other (See Comments)     \"you'll have to restrain me\" zombie like state with scratching at skin and wild.    Tramadol Itching and Confusion     Past Medical History:   Diagnosis Date    Anxiety     Cancer     Basal cell    Depression     GERD (gastroesophageal reflux disease)     Pancreatitis      Past Surgical History:   Procedure Laterality Date    SKIN CANCER EXCISION      6 procedures for skin cancer removal     Family History   Problem Relation Age of Onset    Heart disease Other     Skin cancer Other     Colon cancer Neg Hx        Home Medications:  Prior to Admission medications    Medication Sig Start Date End Date Taking? Authorizing Provider   omeprazole (priLOSEC) 20 MG capsule Take 1 capsule by mouth Daily.    Provider, Historical, MD        Social History:   Social History     Tobacco Use    Smoking status: Former     Current packs/day: 0.00     Average packs/day: 0.5 packs/day for 15.0 years (7.5 ttl pk-yrs)     Types: Cigarettes     Quit date: 2023     Years since quittin.7    " "Smokeless tobacco: Never   Vaping Use    Vaping status: Every Day    Substances: Nicotine, Flavoring    Devices: Disposable   Substance Use Topics    Alcohol use: Yes     Alcohol/week: 1.0 standard drink of alcohol     Types: 1 Cans of beer per week     Comment: socially    Drug use: Never         Review of Systems:  Review of Systems   Constitutional:  Positive for diaphoresis, fatigue and fever. Negative for chills.   HENT:  Negative for congestion, ear pain and sore throat.    Eyes:  Negative for pain.   Respiratory:  Negative for cough, chest tightness and shortness of breath.    Cardiovascular:  Negative for chest pain.   Gastrointestinal:  Negative for abdominal pain, diarrhea, nausea and vomiting.   Genitourinary:  Negative for flank pain and hematuria.   Musculoskeletal:  Negative for joint swelling.   Skin:  Negative for pallor.   Neurological:  Negative for seizures and headaches.   All other systems reviewed and are negative.       Physical Exam:  /69 (BP Location: Right arm, Patient Position: Sitting)   Pulse 106   Temp 98.5 °F (36.9 °C) (Oral)   Resp 18   Ht 167.6 cm (66\")   Wt 68.9 kg (151 lb 14.4 oz)   SpO2 96%   BMI 24.52 kg/m²         Physical Exam  Vitals and nursing note reviewed.   Constitutional:       General: He is not in acute distress.     Appearance: Normal appearance. He is not toxic-appearing.   HENT:      Head: Normocephalic and atraumatic.      Jaw: There is normal jaw occlusion.   Eyes:      General: Lids are normal.      Extraocular Movements: Extraocular movements intact.      Conjunctiva/sclera: Conjunctivae normal.      Pupils: Pupils are equal, round, and reactive to light.   Cardiovascular:      Rate and Rhythm: Normal rate and regular rhythm.      Pulses: Normal pulses.      Heart sounds: Normal heart sounds.   Pulmonary:      Effort: Pulmonary effort is normal. No respiratory distress.      Breath sounds: Normal breath sounds. No wheezing or rhonchi.   Abdominal: "      General: Abdomen is flat.      Palpations: Abdomen is soft.      Tenderness: There is no abdominal tenderness. There is no guarding or rebound.   Musculoskeletal:         General: Normal range of motion.      Cervical back: Normal range of motion and neck supple.      Right lower leg: No edema.      Left lower leg: No edema.   Skin:     General: Skin is warm and dry.      Comments: Patient has 4 sites mirroring each other on the patient's lower lumbar spinal region, the sites appear clean dry and intact without evidence of infection.   Neurological:      Mental Status: He is alert and oriented to person, place, and time. Mental status is at baseline.   Psychiatric:         Mood and Affect: Mood normal.           Procedures:  Procedures      Medical Decision Making:      Comorbidities that affect care:    Anxiety, depression, history of basal cell cancer, GERD, pancreatitis    External Notes reviewed:    Previous Clinic Note: Outpatient cardiology visit 8/28/2024 for palpitations      The following orders were placed and all results were independently analyzed by me:  Orders Placed This Encounter   Procedures    COVID-19, FLU A/B, RSV PCR 1 HR TAT - Swab, Nasopharynx    Rapid Strep A Screen - Swab, Throat    Beta Strep Culture, Throat - Swab, Throat    XR Chest 1 View    Comprehensive Metabolic Panel    CBC Auto Differential    ECG 12 Lead Tachycardia    CBC & Differential       Medications Given in the Emergency Department:  Medications - No data to display     ED Course:    The patient was initially evaluated in the triage area where orders were placed. The patient was later dispositioned by Cale Ulrich MD.      The patient was advised to stay for completion of workup which includes but is not limited to communication of labs and radiological results, reassessment and plan. The patient was advised that leaving prior to disposition by a provider could result in critical findings that are not communicated to  the patient.     ED Course as of 09/26/24 0653   Wed Sep 25, 2024   2146 PROVIDER IN TRIAGE  Patient was evaluated by me in triage, Bailey Seaver, APRN, IFRAH.  Orders were placed and patient is currently awaiting final results and disposition.   [AS]      ED Course User Index  [AS] Seaver, Alyce B, APRN       Labs:    Lab Results (last 24 hours)       Procedure Component Value Units Date/Time    CBC & Differential [[id number]]  (Abnormal) Collected: 09/25/24 2148    Specimen: Blood Updated: 09/25/24 2200    Narrative:      The following orders were created for panel order CBC & Differential.  Procedure                               Abnormality         Status                     ---------                               -----------         ------                     CBC Auto Differential[[id number]]        Abnormal            Final result                 Please view results for these tests on the individual orders.    Comprehensive Metabolic Panel [[id number]]  (Abnormal) Collected: 09/25/24 2148    Specimen: Blood Updated: 09/25/24 2233     Glucose 122 mg/dL      BUN 10 mg/dL      Creatinine 0.99 mg/dL      Sodium 134 mmol/L      Potassium 3.7 mmol/L      Chloride 101 mmol/L      CO2 20.1 mmol/L      Calcium 9.2 mg/dL      Total Protein 7.5 g/dL      Albumin 4.3 g/dL      ALT (SGPT) 29 U/L      AST (SGOT) 19 U/L      Alkaline Phosphatase 68 U/L      Total Bilirubin 0.9 mg/dL      Globulin 3.2 gm/dL      A/G Ratio 1.3 g/dL      BUN/Creatinine Ratio 10.1     Anion Gap 12.9 mmol/L      eGFR 92.8 mL/min/1.73     Narrative:      GFR Normal >60  Chronic Kidney Disease <60  Kidney Failure <15      CBC Auto Differential [[id number]]  (Abnormal) Collected: 09/25/24 2148    Specimen: Blood Updated: 09/25/24 2200     WBC 11.76 10*3/mm3      RBC 4.73 10*6/mm3      Hemoglobin 13.8 g/dL      Hematocrit 40.1 %      MCV 84.8 fL      MCH 29.2 pg      MCHC 34.4 g/dL      RDW 12.4 %      RDW-SD 38.1 fl      MPV 9.6 fL      Platelets 182  10*3/mm3      Neutrophil % 86.7 %      Lymphocyte % 6.9 %      Monocyte % 5.4 %      Eosinophil % 0.1 %      Basophil % 0.5 %      Immature Grans % 0.4 %      Neutrophils, Absolute 10.20 10*3/mm3      Lymphocytes, Absolute 0.81 10*3/mm3      Monocytes, Absolute 0.63 10*3/mm3      Eosinophils, Absolute 0.01 10*3/mm3      Basophils, Absolute 0.06 10*3/mm3      Immature Grans, Absolute 0.05 10*3/mm3      nRBC 0.0 /100 WBC     COVID-19, FLU A/B, RSV PCR 1 HR TAT - Swab, Nasopharynx [[id number]]  (Normal) Collected: 09/25/24 2153    Specimen: Swab from Nasopharynx Updated: 09/25/24 2234     COVID19 Not Detected     Influenza A PCR Not Detected     Influenza B PCR Not Detected     RSV, PCR Not Detected    Narrative:      Fact sheet for providers: https://www.fda.gov/media/006427/download    Fact sheet for patients: https://www.fda.gov/media/433934/download    Test performed by PCR.    Rapid Strep A Screen - Swab, Throat [[id number]]  (Normal) Collected: 09/25/24 2153    Specimen: Swab from Throat Updated: 09/25/24 2208     Strep A Ag Negative    Beta Strep Culture, Throat - Swab, Throat [[id number]] Collected: 09/25/24 2153    Specimen: Swab from Throat Updated: 09/25/24 2208             Imaging:    XR Chest 1 View    Result Date: 9/25/2024  XR CHEST 1 VW Date of Exam: 9/25/2024 10:07 PM EDT Indication: SOB Comparison: Chest radiograph 6/10/2024. Findings: Cardiomediastinal silhouette is within normal limits. No focal consolidation or overt pulmonary edema. No pleural effusion or pneumothorax. Osseous structures are unremarkable.     Impression: No evidence of acute cardiopulmonary disease. Electronically Signed: Carlos A Ulrich MD  9/25/2024 10:19 PM EDT  Workstation ID: [id number]       Differential Diagnosis and Discussion:      Fever: Based on the complaint of fever, differential diagnosis includes but is not limited to meningitis, pneumonia, pyelonephritis, acute uti,  systemic immune response syndrome, sepsis, viral  syndrome, fungal infection, tick born illness and other bacterial infections.    All labs were reviewed and interpreted by me.  All X-rays impressions were independently interpreted by me.    [facility]                   Patient Care Considerations:    ANTIBIOTICS: I considered prescribing antibiotics as an outpatient however no bacterial focus of infection was found.      Consultants/Shared Management Plan:    None    Social Determinants of Health:    Patient is independent, reliable, and has access to care.       Disposition and Care Coordination:    Discharged: The patient is suitable and stable for discharge with no need for consideration of admission.    I have explained the patient´s condition, diagnoses and treatment plan based on the information available to me at this time. I have answered questions and addressed any concerns. The patient has a good  understanding of the patient´s diagnosis, condition, and treatment plan as can be expected at this point. The vital signs have been stable. The patient´s condition is stable and appropriate for discharge from the emergency department.      The patient will pursue further outpatient evaluation with the primary care physician or other designated or consulting physician as outlined in the discharge instructions. They are agreeable to this plan of care and follow-up instructions have been explained in detail. The patient has received these instructions in written format and has expressed an understanding of the discharge instructions. The patient is aware that any significant change in condition or worsening of symptoms should prompt an immediate return to this or the closest emergency department or call to 911.    Final diagnoses:   Acute viral syndrome   Fever, unspecified fever cause        ED Disposition       ED Disposition   Discharge    Condition   Stable    Comment   --               This medical record created using voice recognition software.             Serg  MD Cale  09/26/24 0653

## 2024-09-27 LAB — BACTERIA SPEC AEROBE CULT: NORMAL

## 2024-10-02 LAB
QT INTERVAL: 338 MS
QTC INTERVAL: 423 MS

## 2024-12-12 ENCOUNTER — TELEPHONE (OUTPATIENT)
Dept: GASTROENTEROLOGY | Facility: CLINIC | Age: 49
End: 2024-12-12
Payer: OTHER GOVERNMENT

## 2024-12-12 NOTE — TELEPHONE ENCOUNTER
Regarding referral to  for EUS      From: Becky German APRN   Sent: 12/4/2024   4:18 PM EST   To: Jolene Barraza     Patient needs risks explained of not doing EUS, if he does not want to do this he at least needs repeat imaging such as MRI to ensure pancreas has returned to normal.  We cannot base this on how he feels unfortunately.   ----- Message -----   From: Param Oakley RegSched Rep   Sent: 12/2/2024   3:03 PM EST   To: Becky German, IHSAN     This pt had appt for  8.21.24 and  pt called and canceled it. I S/W pt he stated that he did not want to reschedule. Is ot ok to close?

## 2024-12-16 NOTE — TELEPHONE ENCOUNTER
Called pt, no answer, left message for pt to return the call.  As this is the 3rd attempt to reach pt, letter sent.

## 2025-01-03 ENCOUNTER — OFFICE VISIT (OUTPATIENT)
Dept: INTERNAL MEDICINE | Facility: CLINIC | Age: 50
End: 2025-01-03
Payer: OTHER GOVERNMENT

## 2025-01-03 VITALS
WEIGHT: 151.4 LBS | SYSTOLIC BLOOD PRESSURE: 122 MMHG | OXYGEN SATURATION: 99 % | RESPIRATION RATE: 14 BRPM | BODY MASS INDEX: 24.33 KG/M2 | DIASTOLIC BLOOD PRESSURE: 84 MMHG | HEART RATE: 89 BPM | HEIGHT: 66 IN | TEMPERATURE: 97.8 F

## 2025-01-03 DIAGNOSIS — Z00.00 ANNUAL PHYSICAL EXAM: Primary | ICD-10-CM

## 2025-01-03 DIAGNOSIS — Z87.891 HISTORY OF NICOTINE DEPENDENCE: ICD-10-CM

## 2025-01-03 DIAGNOSIS — K21.9 GASTROESOPHAGEAL REFLUX DISEASE, UNSPECIFIED WHETHER ESOPHAGITIS PRESENT: ICD-10-CM

## 2025-01-03 DIAGNOSIS — M54.50 CHRONIC BILATERAL LOW BACK PAIN WITHOUT SCIATICA: ICD-10-CM

## 2025-01-03 DIAGNOSIS — G89.29 CHRONIC BILATERAL LOW BACK PAIN WITHOUT SCIATICA: ICD-10-CM

## 2025-01-03 DIAGNOSIS — R05.1 ACUTE COUGH: ICD-10-CM

## 2025-01-03 DIAGNOSIS — R00.2 PALPITATIONS: ICD-10-CM

## 2025-01-03 LAB
EXPIRATION DATE: NORMAL
FLUAV AG UPPER RESP QL IA.RAPID: NOT DETECTED
FLUBV AG UPPER RESP QL IA.RAPID: NOT DETECTED
INTERNAL CONTROL: NORMAL
Lab: NORMAL
SARS-COV-2 AG UPPER RESP QL IA.RAPID: NOT DETECTED

## 2025-01-03 PROCEDURE — 99396 PREV VISIT EST AGE 40-64: CPT | Performed by: NURSE PRACTITIONER

## 2025-01-03 PROCEDURE — 87428 SARSCOV & INF VIR A&B AG IA: CPT | Performed by: NURSE PRACTITIONER

## 2025-01-03 NOTE — PROGRESS NOTES
"Chief Complaint  Nasal Congestion, Cough (Symptoms started yesterday 1/2/2025), and watery eyes    Subjective      Adalberto Toribio is a 49 y.o. male who presents to [facility]     Annual physical exam-  Family history of cardiovascular disease on fathers side. Denies chest pain, blurry vision, headache, leg swelling, shortness of breath, seizure activity.     Patient reports nasal congestion, cough and watery eyes x 1 day. Denies fever. Denies COVID and influenza exposures. Denies vomiting and diarrhea. Appetite good, drinking well.     History of nicotine dependence-  Quit smoking in 2023, smoked x 32 years less than 1 PPD. Is currently vaping.     GERD-  Managed with PPI, well controlled.     Lumbago-  Managed by [facility] with injections. No changes since previous visit.     Palpitations-  Patient referred to cardiology at previous visit. Had normal stress test and holter monitor. Has not had an episode of palpitations over the last six months.     Colonoscopy: Cologuard 2024; Denies family history   PSA: 7/2024; Denies family history   Influenza vaccination: Declines  COVID19 vaccination: Declines       Objective   Vital Signs:   Vitals:    01/03/25 0808   BP: 122/84   BP Location: Right arm   Patient Position: Sitting   Cuff Size: Small Adult   Pulse: 89   Resp: 14   Temp: 97.8 °F (36.6 °C)   TempSrc: Temporal   SpO2: 99%   Weight: 68.7 kg (151 lb 6.4 oz)   Height: 167.6 cm (66\")     Body mass index is 24.44 kg/m².    Wt Readings from Last 3 Encounters:   01/03/25 68.7 kg (151 lb 6.4 oz)   09/25/24 68.9 kg (151 lb 14.4 oz)   08/28/24 69.1 kg (152 lb 6.4 oz)     BP Readings from Last 3 Encounters:   01/03/25 122/84   09/25/24 114/69   09/17/24 128/71       Health Maintenance   Topic Date Due    COLORECTAL CANCER SCREENING  Never done    ANNUAL PHYSICAL  Never done    ZOSTER VACCINE (1 of 2) 01/03/2025 (Originally 6/18/2024)    COVID-19 Vaccine (3 - " 2024-25 season) 01/05/2025 (Originally 9/1/2024)    INFLUENZA VACCINE  03/31/2025 (Originally 7/1/2024)    TDAP/TD VACCINES (2 - Td or Tdap) 04/07/2033    HEPATITIS C SCREENING  Completed    Pneumococcal Vaccine 0-64  Aged Out       Physical Exam  Constitutional:       Appearance: Normal appearance.   HENT:      Head: Normocephalic and atraumatic.      Nose: Nose normal.      Mouth/Throat:      Mouth: Mucous membranes are moist.      Pharynx: Oropharynx is clear.   Eyes:      Extraocular Movements: Extraocular movements intact.      Conjunctiva/sclera: Conjunctivae normal.      Pupils: Pupils are equal, round, and reactive to light.   Neck:      Thyroid: No thyroid mass.   Cardiovascular:      Rate and Rhythm: Normal rate and regular rhythm.      Heart sounds: Normal heart sounds.   Pulmonary:      Effort: Pulmonary effort is normal.      Breath sounds: Normal breath sounds.   Skin:     General: Skin is warm and dry.   Neurological:      General: No focal deficit present.      Mental Status: He is alert and oriented to person, place, and time.   Psychiatric:         Mood and Affect: Mood normal.         Behavior: Behavior normal.         Thought Content: Thought content normal.          Result Review :  The following data was reviewed by: IHSAN Pena on 01/03/2025:  Common labs          6/10/2024    18:58 7/1/2024    08:55 9/25/2024    21:48   Common Labs   Glucose 100  95  122    BUN 10  7  10    Creatinine 1.09  0.95  0.99    Sodium 141  139  134    Potassium 3.9  4.5  3.7    Chloride 103  103  101    Calcium 9.1  10.0  9.2    Albumin 4.6  4.8  4.3    Total Bilirubin 0.6  0.6  0.9    Alkaline Phosphatase 69  63  68    AST (SGOT) 16  22  19    ALT (SGPT) 20  26  29    WBC 7.85  6.40  11.76    Hemoglobin 14.3  15.4  13.8    Hematocrit 42.6  46.3  40.1    Platelets 187  190  182    Total Cholesterol  152     Triglycerides  172     HDL Cholesterol  36     LDL Cholesterol   86     Hemoglobin A1C  5.80     PSA   0.649       A1C Last 3 Results          7/1/2024    08:55   HGBA1C Last 3 Results   Hemoglobin A1C 5.80           Procedures          Assessment & Plan  Annual physical exam  Basic labs in clinic today. Encouraged routine dental and eye exams. Discussed age appropriate immunizations, screenings. Age appropriate handout provided, including information on nutrition and physical activity.       Acute cough  Exam reassuring, lung sounds clear, O2 sat 99%. Influenza and COVID negative. Likely viral etiology, discussed expected course of illness. Continue supportive care. Increase fluids, monitor output. Patient declines medication for symptom management. He will seek medical attention immediately with persistent fever, cough, shortness of breath. Patient to call or return to clinic if symptoms worsen or persist.  Orders:    POCT SARS-CoV-2 Antigen ILDEFONSO + Flu    Gastroesophageal reflux disease, unspecified whether esophagitis present  Well controlled, continue PPI.        Palpitations  Resolved.        History of nicotine dependence  Chest CT ordered.   Orders:    CT Chest Low Dose Wo; Future    Chronic bilateral low back pain without sciatica  [facility] will [facility] as [facility].             BMI is within normal parameters. No other follow-up for BMI required.         FOLLOW UP  Return in about 6 months (around 7/3/2025).  Patient was given instructions and counseling regarding his condition or for health maintenance advice. Please see specific information pulled into the AVS if appropriate.       Debra Juarez, IHSAN  01/03/25  10:40 EST    CURRENT & DISCONTINUED MEDICATIONS  Current Outpatient Medications   Medication Instructions    omeprazole (PRILOSEC) 20 mg, Daily       There are no discontinued medications.

## 2025-01-16 ENCOUNTER — HOSPITAL ENCOUNTER (OUTPATIENT)
Dept: CT IMAGING | Facility: HOSPITAL | Age: 50
Discharge: HOME | End: 2025-01-16
Admitting: NURSE PRACTITIONER
Payer: OTHER GOVERNMENT

## 2025-01-16 DIAGNOSIS — Z87.891 HISTORY OF NICOTINE DEPENDENCE: ICD-10-CM

## 2025-01-16 PROCEDURE — 71271 CT THORAX LUNG CANCER SCR C-: CPT

## 2025-06-03 ENCOUNTER — OFFICE VISIT (OUTPATIENT)
Dept: INTERNAL MEDICINE | Facility: CLINIC | Age: 50
End: 2025-06-03
Payer: OTHER GOVERNMENT

## 2025-06-03 VITALS
WEIGHT: 152.4 LBS | RESPIRATION RATE: 16 BRPM | HEIGHT: 66 IN | OXYGEN SATURATION: 97 % | BODY MASS INDEX: 24.49 KG/M2 | DIASTOLIC BLOOD PRESSURE: 82 MMHG | TEMPERATURE: 98.3 F | HEART RATE: 68 BPM | SYSTOLIC BLOOD PRESSURE: 122 MMHG

## 2025-06-03 DIAGNOSIS — C44.319 BASAL CELL CARCINOMA (BCC) OF SKIN OF OTHER PART OF FACE: ICD-10-CM

## 2025-06-03 DIAGNOSIS — M25.512 ACUTE PAIN OF LEFT SHOULDER: Primary | ICD-10-CM

## 2025-06-03 DIAGNOSIS — Z71.6 TOBACCO ABUSE COUNSELING: ICD-10-CM

## 2025-06-03 DIAGNOSIS — M62.838 MUSCLE SPASM: ICD-10-CM

## 2025-06-03 PROBLEM — C44.91 BASAL CELL CARCINOMA: Status: ACTIVE | Noted: 2025-06-03

## 2025-06-03 LAB
ALBUMIN SERPL-MCNC: 4.6 G/DL (ref 3.5–5.2)
ALBUMIN/GLOB SERPL: 1.6 G/DL
ALP SERPL-CCNC: 63 U/L (ref 39–117)
ALT SERPL W P-5'-P-CCNC: 24 U/L (ref 1–41)
ANION GAP SERPL CALCULATED.3IONS-SCNC: 11.8 MMOL/L (ref 5–15)
AST SERPL-CCNC: 34 U/L (ref 1–40)
BASOPHILS # BLD AUTO: 0.08 10*3/MM3 (ref 0–0.2)
BASOPHILS NFR BLD AUTO: 1 % (ref 0–1.5)
BILIRUB SERPL-MCNC: 0.5 MG/DL (ref 0–1.2)
BUN SERPL-MCNC: 7 MG/DL (ref 6–20)
BUN/CREAT SERPL: 7.4 (ref 7–25)
CALCIUM SPEC-SCNC: 9.9 MG/DL (ref 8.6–10.5)
CHLORIDE SERPL-SCNC: 105 MMOL/L (ref 98–107)
CO2 SERPL-SCNC: 23.2 MMOL/L (ref 22–29)
CREAT SERPL-MCNC: 0.94 MG/DL (ref 0.76–1.27)
DEPRECATED RDW RBC AUTO: 43.5 FL (ref 37–54)
EGFRCR SERPLBLD CKD-EPI 2021: 98.8 ML/MIN/1.73
EOSINOPHIL # BLD AUTO: 0.18 10*3/MM3 (ref 0–0.4)
EOSINOPHIL NFR BLD AUTO: 2.2 % (ref 0.3–6.2)
ERYTHROCYTE [DISTWIDTH] IN BLOOD BY AUTOMATED COUNT: 13.5 % (ref 12.3–15.4)
GLOBULIN UR ELPH-MCNC: 2.9 GM/DL
GLUCOSE SERPL-MCNC: 86 MG/DL (ref 65–99)
HCT VFR BLD AUTO: 45.8 % (ref 37.5–51)
HGB BLD-MCNC: 15 G/DL (ref 13–17.7)
IMM GRANULOCYTES # BLD AUTO: 0.02 10*3/MM3 (ref 0–0.05)
IMM GRANULOCYTES NFR BLD AUTO: 0.2 % (ref 0–0.5)
LYMPHOCYTES # BLD AUTO: 2.86 10*3/MM3 (ref 0.7–3.1)
LYMPHOCYTES NFR BLD AUTO: 34.3 % (ref 19.6–45.3)
MAGNESIUM SERPL-MCNC: 2.1 MG/DL (ref 1.6–2.6)
MCH RBC QN AUTO: 29.2 PG (ref 26.6–33)
MCHC RBC AUTO-ENTMCNC: 32.8 G/DL (ref 31.5–35.7)
MCV RBC AUTO: 89.3 FL (ref 79–97)
MONOCYTES # BLD AUTO: 0.58 10*3/MM3 (ref 0.1–0.9)
MONOCYTES NFR BLD AUTO: 7 % (ref 5–12)
NEUTROPHILS NFR BLD AUTO: 4.62 10*3/MM3 (ref 1.7–7)
NEUTROPHILS NFR BLD AUTO: 55.3 % (ref 42.7–76)
NRBC BLD AUTO-RTO: 0 /100 WBC (ref 0–0.2)
PLATELET # BLD AUTO: 192 10*3/MM3 (ref 140–450)
PMV BLD AUTO: 10.7 FL (ref 6–12)
POTASSIUM SERPL-SCNC: 4.3 MMOL/L (ref 3.5–5.2)
PROT SERPL-MCNC: 7.5 G/DL (ref 6–8.5)
RBC # BLD AUTO: 5.13 10*6/MM3 (ref 4.14–5.8)
SODIUM SERPL-SCNC: 140 MMOL/L (ref 136–145)
WBC NRBC COR # BLD AUTO: 8.34 10*3/MM3 (ref 3.4–10.8)

## 2025-06-03 PROCEDURE — 83735 ASSAY OF MAGNESIUM: CPT | Performed by: PHYSICIAN ASSISTANT

## 2025-06-03 PROCEDURE — 99214 OFFICE O/P EST MOD 30 MIN: CPT | Performed by: PHYSICIAN ASSISTANT

## 2025-06-03 PROCEDURE — 36415 COLL VENOUS BLD VENIPUNCTURE: CPT | Performed by: PHYSICIAN ASSISTANT

## 2025-06-03 PROCEDURE — 80053 COMPREHEN METABOLIC PANEL: CPT | Performed by: PHYSICIAN ASSISTANT

## 2025-06-03 PROCEDURE — 85025 COMPLETE CBC W/AUTO DIFF WBC: CPT | Performed by: PHYSICIAN ASSISTANT

## 2025-06-03 NOTE — ASSESSMENT & PLAN NOTE
Will get xray of left shoulder and send patient to PT for further evaluation and treatment.  If symptoms persist or worsen patient would likely benefit from MRI or Ortho referral.

## 2025-06-03 NOTE — PROGRESS NOTES
"Chief Complaint  Shoulder Pain (Injured at work three weeks ago. Not sure if hyper extended. Shoulder is weak and painful, does hurt with ROM. ), Referral Dermatology ([facility] seen by [facility] for Basil Cell Carcinoma, needs an updated referral. ), and Muscle Pain (Having muscle cramping on right thigh, very painful)    Subjective          Adalberto Toribio presents to [facility]    Left shoulder pain- symptoms began about 3 weeks ago.  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  Mostly in the anterior aspect of left shoulder.  He has been babying his shoulder, no other medications at this time.  He has declined  strength in that hand.     Needing referral for Derm with history of Basal Cell Carcinoma    Muscle cramping of legs- tender to walk on.  He often has muscle cramps at nighttime in his legs.  He has tried all kinds of different supplements and electrolytes without improvement.  He has had cramping similar to this in the past.  No hx of blood clots    Smoking 4-5 cigs daily.  Has had increased stress recently.  Not interested in smoking cessation.    Objective   Vital Signs:   /82 (BP Location: Left arm, Patient Position: Sitting, Cuff Size: Small Adult)   Pulse 68   Temp 98.3 °F (36.8 °C) (Temporal)   Resp 16   Ht 167.6 cm (66\")   Wt 69.1 kg (152 lb 6.4 oz)   SpO2 97%   BMI 24.60 kg/m²     Physical Exam  Constitutional:       Appearance: Normal appearance.   HENT:      Head: Normocephalic and atraumatic.   Eyes:      Extraocular Movements: Extraocular movements intact.      Conjunctiva/sclera: Conjunctivae normal.      Pupils: Pupils are equal, round, and reactive to light.   Cardiovascular:      Rate and Rhythm: Normal rate and regular rhythm.      Pulses: Normal pulses.      Heart sounds: Normal heart sounds.   Pulmonary:      Effort: Pulmonary effort is normal. No respiratory " distress.      Breath sounds: Normal breath sounds.   Musculoskeletal:         General: Tenderness present.      Cervical back: Normal range of motion and neck supple. No rigidity.      Comments: Decreased ROM of left shoulder. Empty can test positive.    Skin:     General: Skin is warm and dry.      Coloration: Skin is not pale.   Neurological:      General: No focal deficit present.      Mental Status: He is alert and oriented to person, place, and time. Mental status is at baseline.      Gait: Gait normal.   Psychiatric:         Mood and Affect: Mood normal.         Behavior: Behavior normal.        Result Review :          Procedures      Assessment and Plan    Diagnoses and all orders for this visit:    1. Acute pain of left shoulder (Primary)  Assessment & Plan:  Will get xray of left shoulder and send patient to PT for further evaluation and treatment.  If symptoms persist or worsen patient would likely benefit from MRI or Ortho referral.     Orders:  -     CBC w AUTO Differential  -     Comprehensive metabolic panel  -     Magnesium  -     XR Shoulder 2+ View Left (In Office)  -     Ambulatory Referral to Physical Therapy for Evaluation & Treatment    2. Basal cell carcinoma (BCC) of skin of other part of face  -     Ambulatory Referral to Dermatology    3. Muscle spasm  Assessment & Plan:  Will check basic blood work and magnesium.  Follow up with PCP next month for annual exam.       4. Tobacco abuse counseling  Assessment & Plan:  Discussed importance of quitting and offered nicotine replacement or other medications to help with cessation.  Patient deferred at this time.                 Follow Up   No follow-ups on file.  Patient was given instructions and counseling regarding his condition or for health maintenance advice. Please see specific information pulled into the AVS if appropriate.

## 2025-06-03 NOTE — ASSESSMENT & PLAN NOTE
Discussed importance of quitting and offered nicotine replacement or other medications to help with cessation.  Patient deferred at this time.

## 2025-08-19 ENCOUNTER — OFFICE VISIT (OUTPATIENT)
Dept: INTERNAL MEDICINE | Facility: CLINIC | Age: 50
End: 2025-08-19
Payer: OTHER GOVERNMENT

## 2025-08-19 VITALS
DIASTOLIC BLOOD PRESSURE: 78 MMHG | WEIGHT: 152.6 LBS | SYSTOLIC BLOOD PRESSURE: 120 MMHG | OXYGEN SATURATION: 96 % | HEART RATE: 72 BPM | BODY MASS INDEX: 24.53 KG/M2 | TEMPERATURE: 98.3 F | HEIGHT: 66 IN

## 2025-08-19 DIAGNOSIS — R29.898 WEAKNESS OF LEFT SHOULDER: ICD-10-CM

## 2025-08-19 DIAGNOSIS — G89.29 CHRONIC LEFT SHOULDER PAIN: Primary | ICD-10-CM

## 2025-08-19 DIAGNOSIS — M25.60 DECREASED RANGE OF MOTION: ICD-10-CM

## 2025-08-19 DIAGNOSIS — M25.512 CHRONIC LEFT SHOULDER PAIN: Primary | ICD-10-CM

## 2025-08-19 PROCEDURE — 99213 OFFICE O/P EST LOW 20 MIN: CPT | Performed by: PHYSICIAN ASSISTANT
